# Patient Record
Sex: MALE | Race: WHITE | NOT HISPANIC OR LATINO | Employment: PART TIME | URBAN - METROPOLITAN AREA
[De-identification: names, ages, dates, MRNs, and addresses within clinical notes are randomized per-mention and may not be internally consistent; named-entity substitution may affect disease eponyms.]

---

## 2018-04-24 ENCOUNTER — OFFICE VISIT (OUTPATIENT)
Dept: INTERNAL MEDICINE | Facility: CLINIC | Age: 23
End: 2018-04-24
Payer: COMMERCIAL

## 2018-04-24 ENCOUNTER — TELEPHONE (OUTPATIENT)
Dept: INTERNAL MEDICINE | Facility: CLINIC | Age: 23
End: 2018-04-24

## 2018-04-24 VITALS
OXYGEN SATURATION: 97 % | SYSTOLIC BLOOD PRESSURE: 130 MMHG | BODY MASS INDEX: 26.15 KG/M2 | HEIGHT: 76 IN | HEART RATE: 74 BPM | WEIGHT: 214.75 LBS | TEMPERATURE: 99 F | DIASTOLIC BLOOD PRESSURE: 80 MMHG

## 2018-04-24 DIAGNOSIS — L60.0 INGROWING NAIL: Primary | ICD-10-CM

## 2018-04-24 PROCEDURE — 99213 OFFICE O/P EST LOW 20 MIN: CPT | Mod: S$GLB,,, | Performed by: PHYSICIAN ASSISTANT

## 2018-04-24 PROCEDURE — 99999 PR PBB SHADOW E&M-NEW PATIENT-LVL III: CPT | Mod: PBBFAC,,, | Performed by: PHYSICIAN ASSISTANT

## 2018-04-24 RX ORDER — ISOTRETINOIN 40 MG/1
40 CAPSULE ORAL DAILY
COMMUNITY
End: 2018-07-20

## 2018-04-24 RX ORDER — DOXYCYCLINE 100 MG/1
100 CAPSULE ORAL EVERY 12 HOURS
Qty: 20 CAPSULE | Refills: 0 | Status: SHIPPED | OUTPATIENT
Start: 2018-04-24 | End: 2018-04-27

## 2018-04-24 NOTE — TELEPHONE ENCOUNTER
Spoke with Mr. Raman. The medication was changed to Bactrim DS   Disp 14  1 tablet by mouth two times daily.  Pharmacist Lena  Notified patient by phone of change in medication with verbalized understanding.

## 2018-04-24 NOTE — PROGRESS NOTES
Subjective:       Patient ID: Stiven Arce is a 22 y.o. male.    Chief Complaint: Ingrown Toenail (right)    Ingrown Toenail   This is a new problem. The current episode started in the past 7 days. The problem occurs constantly. The problem has been gradually worsening. Pertinent negatives include no fever. The symptoms are aggravated by bending, standing and walking. Treatments tried: Tried to cut it out his self, but was unsuccesful.     Review of Systems   Constitutional: Negative for fever.       Objective:      Physical Exam   Constitutional: He appears well-developed and well-nourished. No distress.   Musculoskeletal:        Feet:    Skin: He is not diaphoretic.   Nursing note and vitals reviewed.      Assessment:       1. Ingrowing nail        Plan:       Ingrowing nail    Other orders  -     doxycycline (VIBRAMYCIN) 100 MG Cap; Take 1 capsule (100 mg total) by mouth every 12 (twelve) hours.  Dispense: 20 capsule; Refill: 0

## 2018-04-24 NOTE — TELEPHONE ENCOUNTER
----- Message from Ceci Slaughter sent at 4/24/2018  4:29 PM CDT -----  Contact: [facility]  Request a call concerning the pt antibiotic prescription, the pt can be reached at [phone]///thxMW

## 2018-04-24 NOTE — PATIENT INSTRUCTIONS
Continue with antibiotics and new medicines until gone. May take tylenol PRN fever. Increase fluids and rest. Epsome salt soaks. Suggest togo to the Emergency Room if symptoms get much worse. Otherwise follow up with as scheduled.

## 2018-04-27 ENCOUNTER — OFFICE VISIT (OUTPATIENT)
Dept: INTERNAL MEDICINE | Facility: CLINIC | Age: 23
End: 2018-04-27
Payer: COMMERCIAL

## 2018-04-27 VITALS
WEIGHT: 214.75 LBS | SYSTOLIC BLOOD PRESSURE: 126 MMHG | DIASTOLIC BLOOD PRESSURE: 72 MMHG | HEART RATE: 84 BPM | TEMPERATURE: 99 F | OXYGEN SATURATION: 99 % | HEIGHT: 76 IN | BODY MASS INDEX: 26.15 KG/M2

## 2018-04-27 DIAGNOSIS — L60.0 INGROWING NAIL, RIGHT GREAT TOE: Primary | ICD-10-CM

## 2018-04-27 PROCEDURE — 99999 PR PBB SHADOW E&M-EST. PATIENT-LVL IV: CPT | Mod: PBBFAC,,, | Performed by: PHYSICIAN ASSISTANT

## 2018-04-27 PROCEDURE — 99213 OFFICE O/P EST LOW 20 MIN: CPT | Mod: S$GLB,,, | Performed by: PHYSICIAN ASSISTANT

## 2018-04-27 RX ORDER — SULFAMETHOXAZOLE AND TRIMETHOPRIM 800; 160 MG/1; MG/1
1 TABLET ORAL 2 TIMES DAILY
Refills: 0 | COMMUNITY
Start: 2018-04-24 | End: 2018-07-20

## 2018-04-27 RX ORDER — TRAMADOL HYDROCHLORIDE 50 MG/1
50 TABLET ORAL EVERY 6 HOURS PRN
Qty: 15 TABLET | Refills: 0 | Status: SHIPPED | OUTPATIENT
Start: 2018-04-27 | End: 2018-05-02

## 2018-04-27 NOTE — PROGRESS NOTES
Subjective:       Patient ID: Stiven Arce is a 22 y.o. male.    Chief Complaint: Ingrown Toenail (right great toe)    Patient is a 21 yo male who was seen by me 3 to 4 days ago for an ingrown right great toe nail. He was put on antibiotics and is here today for an partial wedge resection. .       Ingrown Toenail   Pertinent negatives include no chills, fatigue or fever.     Review of Systems   Constitutional: Negative for chills, fatigue and fever.       Objective:      Physical Exam   Constitutional: He appears well-developed and well-nourished. No distress.   Musculoskeletal:        Feet:    Skin: He is not diaphoretic.   Nursing note and vitals reviewed.      Assessment:       1. Ingrowing nail, right great toe        Plan:       Ingrowing nail, right great toe  -     Excision Podiatry; Future    The informed consent was obtained, and the time out procedure performed. The involved toe was prepped in a sterile fashion. The involved digit was given 4 cc of lidocaine without epi to both sides and the plantar surface. After adequate anesthesia the involved portion of the  nail was  from the bed using hemostats.  After separation, nail scissors were uses to clip the ingrowing nail. Then it  was removed from the bed. Hydrogen Peroxide and normal saline was used for irrigation, follow by silver nitrate for additional anti-coagulation. Antibiotic dressing with roll gauze was applied. Minimal blood loss noted. Patient should keep the dressing on for 24 hours. Then may soak the involved digit in a warm epsom salt bath twice daily for 25 min. Continue this over the next several day until the foot feesl much better.   Also suggest to keep the foot elevated as much as possible over the next 3 to 4 days then ambulation as tolerated. Notify the clinic if any spreading redness is seen. If the clinic is closed go to the Emergency Room.     Other orders  -     Cancel: (In Office Administered) Tdap Vaccine  -      Cancel: (In Office Administered) HPV Vaccine (9-Valent) (3 Dose) (IM)  Patient declines  Vaccine today  -     traMADol (ULTRAM) 50 mg tablet; Take 1 tablet (50 mg total) by mouth every 6 (six) hours as needed for Pain.  Dispense: 15 tablet; Refill: 0

## 2018-04-27 NOTE — PATIENT INSTRUCTIONS
Ingrown Toenail (Excised)  An ingrown toenail occurs when the nail grows sideways into the skin next to the nail. This can cause pain and may lead to an infection with redness, swelling, and sometimes drainage.  The most common cause of an ingrown toenail is trimming your toenails wrong. Most people trim the nails too close to the skin and try to round the nail too tightly around the shape of the toe. When you do this, the nail can grow into the skin of the toe. While it may look nice, your toenail can grow into the skin and cause infection. It is safer to trim the nail to end in a straight line rather than a curve.  Other causes include injury or wearing shoes that are too short or tight. This can cause the same problem that happens when trimming your toenails. Sometimes you are born with a toenail that grows too large for your toe.  The most common symptoms of an ingrown toenail include:  · Pain  · Redness  · Swelling  · Drainage  Treatment  It's important to treat an ingrown toenail as soon as you notice there is a problem. If the infection is mild, you may be able to take care of it at home. Home care includes:  · Frequent warm water soaks  · Keeping the nail clean  · Wearing loose, comfortable shoes or open toe sandals  Another method to help the toe heal is to use a small piece of cotton or waxed dental floss to gently lift the corner of the problem nail. Change the cotton or floss frequently, especially if it gets dirty.  If your infection is mild but home care isn't working, or the toenail is getting worse, see your healthcare provider. Signs of worsening infection include:  · Swelling  · Redness   · Pus drainage  In some cases, part of the toenail needs to be removed by your healthcare provider so that the infection can be drained.  If there is a lot of redness and swelling, then an antibiotic may also be used. The redness and pain should go away within 48 hours. It will take about 2 weeks for the exposed  nail bed to become dry and for the swelling to go down.  If only the side of the nail was removed, it will begin to grow back in a few months. To prevent recurrence, sometimes the side of the nail bed may be treated with a strong chemical to prevent the nail from growing back.  Home care  Wound care  · Twice a day for the first 3 days, clean and soak the toe as follows:  ¨ Soak your foot in a tub of warm water for 5 minutes. Or, hold your toe under a faucet of warm running water for 5 minutes.  ¨ Clean any remaining crust away with soap and water using a cotton swab.  ¨ Put a small amount of antibiotic ointment on the infected area.  ¨ Cover with a bandage until the exposed nail bed is dry and there is no more drainage.  · Change the dressing or bandage every time you soak or clean it, or whenever it becomes wet or dirty.  · If you were prescribed antibiotics, take them as directed until they are all gone.  · While your toe is healing wear comfortable shoes with a lot of toe room. Or wear open-toe sandals.  Medicines  · You can take over-the-counter medicine for pain, unless you were given a different pain medicine to use. Note: Talk with your healthcare provider before using these medicines if you have chronic liver or kidney disease, have ever had a stomach ulcer or GI (gastrointestinal) bleeding, or are taking blood thinner medicines.  · If you were given antibiotics, take them until they are all gone. It is important to finish the antibiotics even if the wound looks better. This ensures that the infection clears.  Prevention  To prevent ingrown toenails:  · Wear shoes that fit well. Avoid shoes that pinch the toes together.  · When you trim your toenails, dont cut them too short. Cut straight across at the top and dont round the edges.  · Dont use a sharp object to clean under your nail since this might cause an infection.  · If the toenail starts to grow into the skin again, put a small piece of cotton under  that side of the nail to help it grow out straight.  Follow-up care  Follow up as advised by your healthcare provider. If the ingrown toenail recurs, follow up with a foot specialist (podiatrist) for nail bed ablation.  When to seek medical care  Call your healthcare provider right away if any of these occur:  · Increasing redness, pain, or swelling of the toe  · Red streaks in the skin leading away from the wound  · Continued pus or fluid drainage for more than 24 hours  · Fever of 100.4°F (38°C) or higher, or as directed by your provider  Date Last Reviewed: 11/1/2016  © 9469-9797 [employer]. [address] [place] [address], [place] [address]. All rights reserved. This information is not intended as a substitute for professional medical care. Always follow your healthcare professional's instructions.

## 2018-07-20 ENCOUNTER — OFFICE VISIT (OUTPATIENT)
Dept: INTERNAL MEDICINE | Facility: CLINIC | Age: 23
End: 2018-07-20
Payer: COMMERCIAL

## 2018-07-20 VITALS
TEMPERATURE: 99 F | OXYGEN SATURATION: 98 % | SYSTOLIC BLOOD PRESSURE: 122 MMHG | HEART RATE: 63 BPM | DIASTOLIC BLOOD PRESSURE: 70 MMHG | HEIGHT: 76 IN | BODY MASS INDEX: 26.39 KG/M2 | WEIGHT: 216.69 LBS

## 2018-07-20 DIAGNOSIS — F41.9 ANXIETY: ICD-10-CM

## 2018-07-20 DIAGNOSIS — J02.9 ACUTE PHARYNGITIS, UNSPECIFIED ETIOLOGY: Primary | ICD-10-CM

## 2018-07-20 LAB — DEPRECATED S PYO AG THROAT QL EIA: NEGATIVE

## 2018-07-20 PROCEDURE — 87081 CULTURE SCREEN ONLY: CPT

## 2018-07-20 PROCEDURE — 3008F BODY MASS INDEX DOCD: CPT | Mod: CPTII,S$GLB,, | Performed by: PHYSICIAN ASSISTANT

## 2018-07-20 PROCEDURE — 87880 STREP A ASSAY W/OPTIC: CPT

## 2018-07-20 PROCEDURE — 99999 PR PBB SHADOW E&M-EST. PATIENT-LVL IV: CPT | Mod: PBBFAC,,, | Performed by: PHYSICIAN ASSISTANT

## 2018-07-20 PROCEDURE — 99214 OFFICE O/P EST MOD 30 MIN: CPT | Mod: S$GLB,,, | Performed by: PHYSICIAN ASSISTANT

## 2018-07-20 RX ORDER — AZITHROMYCIN 250 MG/1
TABLET, FILM COATED ORAL
Qty: 6 TABLET | Refills: 0 | Status: SHIPPED | OUTPATIENT
Start: 2018-07-20 | End: 2018-09-04

## 2018-07-20 RX ORDER — PAROXETINE HYDROCHLORIDE HEMIHYDRATE 25 MG/1
25 TABLET, FILM COATED, EXTENDED RELEASE ORAL EVERY MORNING
Qty: 30 TABLET | Refills: 1 | Status: SHIPPED | OUTPATIENT
Start: 2018-07-20 | End: 2018-09-04

## 2018-07-20 NOTE — PATIENT INSTRUCTIONS
Understanding Anxiety Disorders  Almost everyone gets nervous now and then. Its normal to have knots in your stomach before a test, or for your heart to race on a first date. But an anxiety disorder is much more than a case of nerves. In fact, its symptoms may be overwhelming. But treatment can relieve many of these symptoms. Talking to your healthcare provider is the first step.    What are anxiety disorders?  An anxiety disorder causes intense feelings of panic and fear. These feelings may arise for no apparent reason. And they tend to recur again and again. They may prevent you from coping with life and cause you great distress. As a result, you may avoid anything that triggers your fear. In extreme cases, you may never leave the house. Anxiety disorders may cause other symptoms, such as:  · Obsessive thoughts you cant control  · Constant nightmares or painful thoughts of the past  · Nausea, sweating, and muscle tension  · Trouble sleeping or concentrating  What causes anxiety disorders?  Anxiety disorders tend to run in families. For some people, childhood abuse or neglect may play a role. For others, stressful life events or trauma may trigger anxiety disorders. Anxiety can trigger low self-esteem and poor coping skills.  Common anxiety disorders  · Panic disorder. This causes an intense fear of being in danger.  · Phobias. These are extreme fears of certain objects, places, or events.  · Obsessive-compulsive disorder. This causes you to have unwanted thoughts and urges. You also may perform certain actions over and over.  · Posttraumatic stress disorder. This occurs in people who have survived a terrible ordeal. It can cause nightmares and flashbacks about the event.  · Generalized anxiety disorder. This causes constant worry that can greatly disrupt your life.   Getting better  You may believe that nothing can help you. Or, you might fear what others may think. But most anxiety symptoms can be eased.  Having an anxiety disorder is nothing to be ashamed of. Most people do best with treatment that combines medicine and therapy. These arent cures. But they can help you live a healthier life.  Date Last Reviewed: 2/1/2017  © 2623-5963 [employer]. [address] [place] [address], [place] [address]. All rights reserved. This information is not intended as a substitute for professional medical care. Always follow your healthcare professional's instructions.

## 2018-07-20 NOTE — PROGRESS NOTES
Subjective:       Patient ID: Stiven Arce is a 22 y.o. male.    Chief Complaint: Sore Throat and Anxiety    Sore Throat    This is a new problem. The current episode started in the past 7 days. The problem has been unchanged. There has been no fever. Associated symptoms include headaches, swollen glands and trouble swallowing. Pertinent negatives include no abdominal pain, congestion or shortness of breath. He has had exposure to strep. He has had no exposure to mono. He has tried nothing for the symptoms.   Anxiety   Presents for initial visit. Onset was at an unknown time. The problem has been gradually worsening. Symptoms include decreased concentration, muscle tension and nervous/anxious behavior. Patient reports no chest pain, confusion, shortness of breath or suicidal ideas. Symptoms occur most days. The severity of symptoms is interfering with daily activities. The symptoms are aggravated by specific phobias and social activities. The quality of sleep is good.     Risk factors include family history. Past treatments include nothing.     Review of Systems   Constitutional: Negative for chills and fatigue.   HENT: Positive for sore throat and trouble swallowing. Negative for congestion, postnasal drip and sinus pressure.    Respiratory: Negative for chest tightness and shortness of breath.    Cardiovascular: Negative for chest pain.   Gastrointestinal: Negative for abdominal pain.   Neurological: Positive for headaches.   Psychiatric/Behavioral: Positive for decreased concentration. Negative for agitation, behavioral problems, confusion, dysphoric mood, hallucinations, self-injury, sleep disturbance and suicidal ideas. The patient is nervous/anxious. The patient is not hyperactive.        Objective:      Physical Exam   Constitutional: He appears well-developed and well-nourished. No distress.   HENT:   Head: Normocephalic and atraumatic.   Right Ear: Tympanic membrane and ear canal normal.   Left Ear:  Tympanic membrane and ear canal normal.   Nose: Mucosal edema and rhinorrhea present.   Mouth/Throat: Uvula is midline. Posterior oropharyngeal edema and posterior oropharyngeal erythema present. Tonsillar exudate.   Skin: He is not diaphoretic.   Nursing note and vitals reviewed.      Assessment:       1. Acute pharyngitis, unspecified etiology    2. Anxiety        Plan:       Acute pharyngitis, unspecified etiology  -     Throat Screen, Rapid    Anxiety    Other orders  -     paroxetine (PAXIL-CR) 25 MG 24 hr tablet; Take 1 tablet (25 mg total) by mouth every morning.  Dispense: 30 tablet; Refill: 1  -     Strep A culture, throat  -     azithromycin (Z-REJI) 250 MG tablet; Follow instructions on pack.  Dispense: 6 tablet; Refill: 0

## 2018-07-23 LAB — BACTERIA THROAT CULT: NORMAL

## 2018-09-04 ENCOUNTER — OFFICE VISIT (OUTPATIENT)
Dept: FAMILY MEDICINE | Facility: CLINIC | Age: 23
End: 2018-09-04
Payer: COMMERCIAL

## 2018-09-04 VITALS
HEIGHT: 76 IN | HEART RATE: 86 BPM | BODY MASS INDEX: 26.2 KG/M2 | OXYGEN SATURATION: 99 % | WEIGHT: 215.19 LBS | SYSTOLIC BLOOD PRESSURE: 123 MMHG | RESPIRATION RATE: 19 BRPM | TEMPERATURE: 98 F | DIASTOLIC BLOOD PRESSURE: 71 MMHG

## 2018-09-04 DIAGNOSIS — J02.0 STREP PHARYNGITIS: Primary | ICD-10-CM

## 2018-09-04 LAB
CTP QC/QA: YES
S PYO RRNA THROAT QL PROBE: POSITIVE

## 2018-09-04 PROCEDURE — 99214 OFFICE O/P EST MOD 30 MIN: CPT | Mod: 25,S$GLB,, | Performed by: NURSE PRACTITIONER

## 2018-09-04 PROCEDURE — 96372 THER/PROPH/DIAG INJ SC/IM: CPT | Mod: S$GLB,,, | Performed by: NURSE PRACTITIONER

## 2018-09-04 PROCEDURE — 87880 STREP A ASSAY W/OPTIC: CPT | Mod: QW,S$GLB,, | Performed by: NURSE PRACTITIONER

## 2018-09-04 PROCEDURE — 99999 PR PBB SHADOW E&M-EST. PATIENT-LVL III: CPT | Mod: PBBFAC,,, | Performed by: NURSE PRACTITIONER

## 2018-09-04 PROCEDURE — 3008F BODY MASS INDEX DOCD: CPT | Mod: CPTII,S$GLB,, | Performed by: NURSE PRACTITIONER

## 2018-09-04 RX ORDER — METHYLPREDNISOLONE ACETATE 80 MG/ML
80 INJECTION, SUSPENSION INTRA-ARTICULAR; INTRALESIONAL; INTRAMUSCULAR; SOFT TISSUE ONCE
Status: COMPLETED | OUTPATIENT
Start: 2018-09-04 | End: 2018-09-04

## 2018-09-04 RX ADMIN — METHYLPREDNISOLONE ACETATE 80 MG: 80 INJECTION, SUSPENSION INTRA-ARTICULAR; INTRALESIONAL; INTRAMUSCULAR; SOFT TISSUE at 10:09

## 2018-09-04 NOTE — PROGRESS NOTES
"CC: Sore Throat  HPI: This is a new problem.   Patient complains of sore throat. Associated symptoms include 100 and sore throat. Onset of symptoms was 4 days ago, and have been unchanged since that time. He is drinking plenty of fluids. He has not had recent close exposure to someone with proven streptococcal pharyngitis.    [unfilled]  Outpatient Medications Prior to Visit   Medication Sig Dispense Refill    azithromycin (Z-REJI) 250 MG tablet Follow instructions on pack. 6 tablet 0    paroxetine (PAXIL-CR) 25 MG 24 hr tablet Take 1 tablet (25 mg total) by mouth every morning. 30 tablet 1     No facility-administered medications prior to visit.         Physical Exam   /71   Pulse 86   Temp 97.8 °F (36.6 °C) (Tympanic)   Resp 19   Ht 6' 4" (1.93 m)   Wt 97.6 kg (215 lb 2.7 oz)   SpO2 99%   BMI 26.19 kg/m²   Constitutional: The patient appears well-developed and well-nourished.   Head: Normocephalic and atraumatic.   Right Ear: Tympanic membrane and ear canal normal. No drainage, swelling or tenderness. Tympanic membrane is not injected, not erythematous and not bulging.   Left Ear: Ear canal normal. No drainage, swelling or tenderness. Tympanic membrane is not injected, not erythematous and not bulging.   Nose: Mucosal edema and rhinorrhea present. No sinus tenderness on palpation  Mouth/Throat: Uvula is midline. Posterior oropharyngeal erythema present. No oropharyngeal exudate.        THE MUCOSA IS BOGGY AND ERYTHEMATOUS.     Eyes: Conjunctivae normal and lids are normal. Pupils are equal, round, and reactive to light. Right eye exhibits no discharge. Left eye exhibits no discharge. Right eye exhibits normal extraocular motion. Left eye exhibits normal extraocular motion.   Neck: Trachea normal and normal range of motion. Neck supple. No tracheal tenderness present. No mass and no thyromegaly present.   Cardiovascular: Normal rate, regular rhythm, S1 normal, S2 normal and normal heart sounds.  " Exam reveals no gallop, no S3, no S4 and no friction rub.    No murmur heard.  Pulmonary/Chest: Effort normal and breath sounds normal. No stridor. Not tachypneic. No respiratory distress. The patient has no wheezes. The patient has no rhonchi. The patient has no rales.   Skin: The patient is not diaphoretic.     Encounter Diagnosis   Name Primary?    Strep pharyngitis Yes       PLAN:    Stiven was seen today for sore throat, nasal congestion and fever.    Diagnoses and all orders for this visit:    Strep pharyngitis  -     POCT Rapid Strep A  -     Strep A culture, throat  -     penicillin G benzathine (BICILLIN LA) injection 1.2 Million Units; Inject 2 mLs (1.2 Million Units total) into the muscle one time.  -     methylPREDNISolone acetate injection 80 mg; Inject 1 mL (80 mg total) into the muscle once.      Medications Ordered This Encounter   Medications    methylPREDNISolone acetate injection 80 mg    penicillin G benzathine (BICILLIN LA) injection 1.2 Million Units     Orders Placed This Encounter   Procedures    Strep A culture, throat    POCT Rapid Strep A     RTC if symptoms are worsening or changing significantly or if not improved by the end of therapy.

## 2018-10-04 ENCOUNTER — OFFICE VISIT (OUTPATIENT)
Dept: FAMILY MEDICINE | Facility: CLINIC | Age: 23
End: 2018-10-04
Payer: COMMERCIAL

## 2018-10-04 VITALS
TEMPERATURE: 99 F | BODY MASS INDEX: 24.47 KG/M2 | WEIGHT: 200.94 LBS | HEIGHT: 76 IN | HEART RATE: 98 BPM | OXYGEN SATURATION: 100 % | SYSTOLIC BLOOD PRESSURE: 108 MMHG | DIASTOLIC BLOOD PRESSURE: 68 MMHG

## 2018-10-04 DIAGNOSIS — A08.4 VIRAL GASTROENTERITIS: Primary | ICD-10-CM

## 2018-10-04 PROCEDURE — 99213 OFFICE O/P EST LOW 20 MIN: CPT | Mod: S$GLB,,, | Performed by: NURSE PRACTITIONER

## 2018-10-04 PROCEDURE — 99999 PR PBB SHADOW E&M-EST. PATIENT-LVL III: CPT | Mod: PBBFAC,,, | Performed by: NURSE PRACTITIONER

## 2018-10-04 PROCEDURE — 3008F BODY MASS INDEX DOCD: CPT | Mod: CPTII,S$GLB,, | Performed by: NURSE PRACTITIONER

## 2018-10-04 RX ORDER — ONDANSETRON HYDROCHLORIDE 8 MG/1
8 TABLET, FILM COATED ORAL EVERY 8 HOURS PRN
Qty: 24 TABLET | Refills: 0 | Status: SHIPPED | OUTPATIENT
Start: 2018-10-04 | End: 2020-01-08

## 2018-10-04 NOTE — PROGRESS NOTES
"CC:Nausea/Vomiting  HPI:This is a new problem.   The current episode started in the past 4 days.   The problem has been gradually worsening.   Associated symptoms included diarrhea occurring for one day. Nausea no vomiting, girlfriend sick with similar symptoms  Pertinent negatives include inability to keep down fluids.  Treatments tried: none has been used and this has provided no relief.     [unfilled]  No outpatient medications prior to visit.     No facility-administered medications prior to visit.         Physical Exam   /68 (BP Location: Left arm, Patient Position: Sitting, BP Method: Large (Manual))   Pulse 98   Temp 99 °F (37.2 °C) (Tympanic)   Ht 6' 4" (1.93 m)   Wt 91.2 kg (200 lb 15.2 oz)   SpO2 100%   BMI 24.46 kg/m²   Constitutional: The patient appears well-developed and well-nourished.   Head: Normocephalic and atraumatic.   Right Ear: Tympanic membrane and ear canal normal. No drainage, swelling or tenderness. Tympanic membrane is not injected, not erythematous and not bulging.   Left Ear: Ear canal normal. No drainage, swelling or tenderness. Tympanic membrane is not injected, not erythematous and not bulging.   Nose:  No mucosal edema or rhinitis is noted.      Mouth/Throat: Uvula is midline.  Posterior oropharynx is not erythematous. No oropharyngeal exudate is noted.    Eyes: Conjunctivae normal and lids are normal. Pupils are equal, round, and reactive to light. Right eye exhibits no discharge. Left eye exhibits no discharge. Right eye exhibits normal extraocular motion. Left eye exhibits normal extraocular motion.   Neck: Trachea normal and normal range of motion. Neck supple. No tracheal tenderness present. No mass and no thyromegaly present.   Cardiovascular: Normal rate, regular rhythm, S1 normal, S2 normal and normal heart sounds.  Exam reveals no gallop, no S3, no S4 and no friction rub.    No murmur heard.  Pulmonary/Chest: Effort normal and breath sounds are normal. No " stridor. Not tachypneic. No respiratory distress. The patient has no wheezes, ronchi, or rales.   Abdomen: The patient has hyperactive bowel sounds and has mild diffuse tenderness without any focal pain.  There is no rebound or guarding noted.  No distention is noted.  Skin: The patient is not diaphoretic.     Encounter Diagnosis   Name Primary?    Viral gastroenteritis Yes       PLAN:    Stiven was seen today for abdominal pain, diarrhea and fever.    Diagnoses and all orders for this visit:    Viral gastroenteritis  -     ondansetron (ZOFRAN) 8 MG tablet; Take 1 tablet (8 mg total) by mouth every 8 (eight) hours as needed for Nausea.  Soft bland diet. Avoid use of anti-motility    Medications Ordered This Encounter   Medications    ondansetron (ZOFRAN) 8 MG tablet     Sig: Take 1 tablet (8 mg total) by mouth every 8 (eight) hours as needed for Nausea.     Dispense:  24 tablet     Refill:  0     No orders of the defined types were placed in this encounter.    RTC if symptoms are worsening or changing significantly or if not improved by the end of therapy.

## 2018-10-04 NOTE — PATIENT INSTRUCTIONS

## 2019-12-04 ENCOUNTER — OFFICE VISIT (OUTPATIENT)
Dept: FAMILY MEDICINE | Facility: CLINIC | Age: 24
End: 2019-12-04
Payer: COMMERCIAL

## 2019-12-04 VITALS
HEIGHT: 76 IN | RESPIRATION RATE: 16 BRPM | BODY MASS INDEX: 25.1 KG/M2 | HEART RATE: 68 BPM | WEIGHT: 206.13 LBS | DIASTOLIC BLOOD PRESSURE: 80 MMHG | SYSTOLIC BLOOD PRESSURE: 120 MMHG | TEMPERATURE: 98 F | OXYGEN SATURATION: 98 %

## 2019-12-04 DIAGNOSIS — F40.10 SOCIAL ANXIETY DISORDER: Primary | ICD-10-CM

## 2019-12-04 PROCEDURE — 99214 OFFICE O/P EST MOD 30 MIN: CPT | Mod: S$GLB,,, | Performed by: FAMILY MEDICINE

## 2019-12-04 PROCEDURE — 3008F BODY MASS INDEX DOCD: CPT | Mod: CPTII,S$GLB,, | Performed by: FAMILY MEDICINE

## 2019-12-04 PROCEDURE — 99999 PR PBB SHADOW E&M-NEW PATIENT-LVL III: ICD-10-PCS | Mod: PBBFAC,,, | Performed by: FAMILY MEDICINE

## 2019-12-04 PROCEDURE — 99214 PR OFFICE/OUTPT VISIT, EST, LEVL IV, 30-39 MIN: ICD-10-PCS | Mod: S$GLB,,, | Performed by: FAMILY MEDICINE

## 2019-12-04 PROCEDURE — 3008F PR BODY MASS INDEX (BMI) DOCUMENTED: ICD-10-PCS | Mod: CPTII,S$GLB,, | Performed by: FAMILY MEDICINE

## 2019-12-04 PROCEDURE — 99999 PR PBB SHADOW E&M-NEW PATIENT-LVL III: CPT | Mod: PBBFAC,,, | Performed by: FAMILY MEDICINE

## 2019-12-04 RX ORDER — SERTRALINE HYDROCHLORIDE 25 MG/1
25 TABLET, FILM COATED ORAL DAILY
Qty: 30 TABLET | Refills: 1 | Status: SHIPPED | OUTPATIENT
Start: 2019-12-04 | End: 2020-01-08

## 2019-12-04 NOTE — PROGRESS NOTES
Subjective:       Patient ID: Stiven Arce is a 23 y.o. male.    Chief Complaint: Anxiety    Anxiety   Presents for initial visit. Onset was more than 5 years ago. The problem has been unchanged. Symptoms include excessive worry and nervous/anxious behavior. Patient reports no chest pain, decreased concentration, depressed mood, dizziness, insomnia, irritability, nausea, panic, shortness of breath or suicidal ideas. Symptoms occur most days. The severity of symptoms is severe. The symptoms are aggravated by social activities.     There are no known risk factors. His past medical history is significant for anxiety/panic attacks. There is no history of bipolar disorder, depression or suicide attempts. Treatments tried: vistaril. The treatment provided no relief.   Patient notes having significant social stressors. Has tried Vistaril without improvement. Desires maintenance medication. His mother is good a support system. He lives alone and currently works as a .    Past Medical History:   Diagnosis Date    Anxiety      Past Surgical History:   Procedure Laterality Date    WISDOM TOOTH EXTRACTION       Family History   Problem Relation Age of Onset    Anxiety disorder Mother     No Known Problems Father      Review of Systems   Constitutional: Negative for fatigue, irritability and unexpected weight change.   HENT: Negative for congestion, ear pain and sinus pressure.    Eyes: Negative for visual disturbance.   Respiratory: Negative for cough and shortness of breath.    Cardiovascular: Negative for chest pain.   Gastrointestinal: Negative for diarrhea and nausea.   Genitourinary: Negative for decreased urine volume and difficulty urinating.   Musculoskeletal: Negative for arthralgias and myalgias.   Skin: Negative for rash.   Neurological: Negative for dizziness, weakness and headaches.   Psychiatric/Behavioral: Negative for decreased concentration and suicidal ideas. The patient is  "nervous/anxious. The patient does not have insomnia.        Objective:   /80   Pulse 68   Temp 97.6 °F (36.4 °C) (Temporal)   Resp 16   Ht 6' 4" (1.93 m)   Wt 93.5 kg (206 lb 2.1 oz)   SpO2 98%   BMI 25.09 kg/m²   Physical Exam   Constitutional: He is oriented to person, place, and time. He appears well-developed and well-nourished. No distress.   HENT:   Head: Normocephalic and atraumatic.   Right Ear: External ear normal.   Left Ear: External ear normal.   Nose: Nose normal.   Mouth/Throat: Oropharynx is clear and moist.   Eyes: Pupils are equal, round, and reactive to light. Conjunctivae and EOM are normal.   Neck: Normal range of motion. Neck supple.   Cardiovascular: Normal rate, regular rhythm and normal heart sounds.   Pulmonary/Chest: Effort normal and breath sounds normal.   Abdominal: Soft. Bowel sounds are normal.   Neurological: He is alert and oriented to person, place, and time.   Skin: Skin is warm and dry. He is not diaphoretic.   Psychiatric: He has a normal mood and affect. His behavior is normal.       Assessment:       1. Social anxiety disorder        Plan:      Social anxiety disorder  New diagnosis. Spent time today discussing treatment options and reviewing coping mechanisms. Will initiate Zoloft- timing of administration and s/e profile has been reviewed. Patient is aware of counseling/therapy options within [facility] and has declined referral at this time. Will plan on short interval follow-up and he knows to contact me through the patient portal with additional concerns.  -     sertraline (ZOLOFT) 25 MG tablet; Take 1 tablet (25 mg total) by mouth once daily.  Dispense: 30 tablet; Refill: 1        "

## 2020-01-08 ENCOUNTER — OFFICE VISIT (OUTPATIENT)
Dept: FAMILY MEDICINE | Facility: CLINIC | Age: 25
End: 2020-01-08
Payer: COMMERCIAL

## 2020-01-08 VITALS
TEMPERATURE: 98 F | OXYGEN SATURATION: 98 % | BODY MASS INDEX: 25.05 KG/M2 | WEIGHT: 205.69 LBS | RESPIRATION RATE: 16 BRPM | HEART RATE: 81 BPM | SYSTOLIC BLOOD PRESSURE: 110 MMHG | DIASTOLIC BLOOD PRESSURE: 60 MMHG | HEIGHT: 76 IN

## 2020-01-08 DIAGNOSIS — F40.10 SOCIAL ANXIETY DISORDER: ICD-10-CM

## 2020-01-08 PROCEDURE — 99999 PR PBB SHADOW E&M-EST. PATIENT-LVL III: ICD-10-PCS | Mod: PBBFAC,,, | Performed by: FAMILY MEDICINE

## 2020-01-08 PROCEDURE — 99213 PR OFFICE/OUTPT VISIT, EST, LEVL III, 20-29 MIN: ICD-10-PCS | Mod: S$GLB,,, | Performed by: FAMILY MEDICINE

## 2020-01-08 PROCEDURE — 99999 PR PBB SHADOW E&M-EST. PATIENT-LVL III: CPT | Mod: PBBFAC,,, | Performed by: FAMILY MEDICINE

## 2020-01-08 PROCEDURE — 3008F PR BODY MASS INDEX (BMI) DOCUMENTED: ICD-10-PCS | Mod: CPTII,S$GLB,, | Performed by: FAMILY MEDICINE

## 2020-01-08 PROCEDURE — 99213 OFFICE O/P EST LOW 20 MIN: CPT | Mod: S$GLB,,, | Performed by: FAMILY MEDICINE

## 2020-01-08 PROCEDURE — 3008F BODY MASS INDEX DOCD: CPT | Mod: CPTII,S$GLB,, | Performed by: FAMILY MEDICINE

## 2020-01-08 RX ORDER — SERTRALINE HYDROCHLORIDE 50 MG/1
50 TABLET, FILM COATED ORAL DAILY
Qty: 30 TABLET | Refills: 1 | Status: SHIPPED | OUTPATIENT
Start: 2020-01-08 | End: 2020-12-15 | Stop reason: SDUPTHER

## 2020-01-08 NOTE — PROGRESS NOTES
"Subjective:       Patient ID: Stiven Arce is a 24 y.o. male.    Chief Complaint: Follow-up    HPI   Follow-up  23yo male presents today for follow-up. He was recently evaluated with concerns for social anxiety and Zoloft was initiated. He notes that since starting treatment there has been some improvement with regard to mood symptoms. He does feel that he would benefit from dosing adjustment. No adverse effects of treatment reported. He denies any depressive symptoms or trouble sleeping. No report of SI/HI/AH or VH. He does have a good support system.    Review of Systems   Constitutional: Negative for activity change, appetite change, fatigue and unexpected weight change.   Eyes: Negative for visual disturbance.   Respiratory: Negative for cough and shortness of breath.    Cardiovascular: Negative for chest pain and palpitations.   Gastrointestinal: Negative for diarrhea and nausea.   Genitourinary: Negative for decreased urine volume and difficulty urinating.   Musculoskeletal: Negative for arthralgias and myalgias.   Neurological: Negative for dizziness and headaches.   Psychiatric/Behavioral: Negative for dysphoric mood and sleep disturbance. The patient is nervous/anxious.        Objective:   /60   Pulse 81   Temp 97.8 °F (36.6 °C) (Temporal)   Resp 16   Ht 6' 4" (1.93 m)   Wt 93.3 kg (205 lb 11 oz)   SpO2 98%   BMI 25.04 kg/m²   Physical Exam   Constitutional: He is oriented to person, place, and time. He appears well-developed and well-nourished. No distress.   HENT:   Head: Normocephalic and atraumatic.   Right Ear: External ear normal.   Left Ear: External ear normal.   Nose: Nose normal.   Mouth/Throat: Oropharynx is clear and moist.   Eyes: Pupils are equal, round, and reactive to light. Conjunctivae and EOM are normal.   Neck: Normal range of motion. Neck supple.   Cardiovascular: Normal rate, regular rhythm and normal heart sounds.   Pulmonary/Chest: Effort normal and breath " sounds normal.   Neurological: He is alert and oriented to person, place, and time.   Skin: Skin is warm. He is not diaphoretic.   Psychiatric: He has a normal mood and affect. His behavior is normal.       Assessment:       1. Social anxiety disorder        Plan:      Social anxiety disorder  Uncontrolled. Spent time today discussing treatment options and reviewing coping mechanisms. Patient is aware of SW[facility]counseling options within [facility]. Adjustments to medication regimen have been made today and arrangements for short interval follow-up have been made. Patient knows to contact me in the interim with additional concerns.   -     sertraline (ZOLOFT) 50 MG tablet; Take 1 tablet (50 mg total) by mouth once daily.  Dispense: 30 tablet; Refill: 1

## 2020-03-07 DIAGNOSIS — F40.10 SOCIAL ANXIETY DISORDER: ICD-10-CM

## 2020-03-09 ENCOUNTER — PATIENT MESSAGE (OUTPATIENT)
Dept: FAMILY MEDICINE | Facility: CLINIC | Age: 25
End: 2020-03-09

## 2020-03-09 RX ORDER — SERTRALINE HYDROCHLORIDE 50 MG/1
TABLET, FILM COATED ORAL
Qty: 30 TABLET | Refills: 1 | OUTPATIENT
Start: 2020-03-09

## 2020-03-09 NOTE — TELEPHONE ENCOUNTER
Patient missed his follow-up and needs to be scheduled. Offer appt this week. Can provide a short supply of RX if needed.

## 2020-12-15 ENCOUNTER — OFFICE VISIT (OUTPATIENT)
Dept: FAMILY MEDICINE | Facility: CLINIC | Age: 25
End: 2020-12-15
Attending: FAMILY MEDICINE
Payer: COMMERCIAL

## 2020-12-15 VITALS
HEIGHT: 76 IN | BODY MASS INDEX: 25.02 KG/M2 | WEIGHT: 205.5 LBS | OXYGEN SATURATION: 98 % | SYSTOLIC BLOOD PRESSURE: 112 MMHG | HEART RATE: 80 BPM | TEMPERATURE: 100 F | DIASTOLIC BLOOD PRESSURE: 56 MMHG

## 2020-12-15 DIAGNOSIS — F40.10 SOCIAL ANXIETY DISORDER: ICD-10-CM

## 2020-12-15 PROCEDURE — 99213 PR OFFICE/OUTPT VISIT, EST, LEVL III, 20-29 MIN: ICD-10-PCS | Mod: S$GLB,,, | Performed by: FAMILY MEDICINE

## 2020-12-15 PROCEDURE — 1126F AMNT PAIN NOTED NONE PRSNT: CPT | Mod: S$GLB,,, | Performed by: FAMILY MEDICINE

## 2020-12-15 PROCEDURE — 3008F BODY MASS INDEX DOCD: CPT | Mod: CPTII,S$GLB,, | Performed by: FAMILY MEDICINE

## 2020-12-15 PROCEDURE — 99999 PR PBB SHADOW E&M-EST. PATIENT-LVL III: CPT | Mod: PBBFAC,,, | Performed by: FAMILY MEDICINE

## 2020-12-15 PROCEDURE — 3008F PR BODY MASS INDEX (BMI) DOCUMENTED: ICD-10-PCS | Mod: CPTII,S$GLB,, | Performed by: FAMILY MEDICINE

## 2020-12-15 PROCEDURE — 99999 PR PBB SHADOW E&M-EST. PATIENT-LVL III: ICD-10-PCS | Mod: PBBFAC,,, | Performed by: FAMILY MEDICINE

## 2020-12-15 PROCEDURE — 99213 OFFICE O/P EST LOW 20 MIN: CPT | Mod: S$GLB,,, | Performed by: FAMILY MEDICINE

## 2020-12-15 PROCEDURE — 1126F PR PAIN SEVERITY QUANTIFIED, NO PAIN PRESENT: ICD-10-PCS | Mod: S$GLB,,, | Performed by: FAMILY MEDICINE

## 2020-12-15 RX ORDER — SERTRALINE HYDROCHLORIDE 50 MG/1
50 TABLET, FILM COATED ORAL DAILY
Qty: 30 TABLET | Refills: 6 | Status: SHIPPED | OUTPATIENT
Start: 2020-12-15 | End: 2021-10-05

## 2020-12-15 NOTE — PROGRESS NOTES
Subjective:       Patient ID: Stiven Arce is a 24 y.o. male.    Chief Complaint: Medication Refill    24 y old male  With SAD here for f.u . He ran out of zoloft in March . Previously doing well . Now  Has been more anxious since the  holidays . No depression . Will like to resume taking it .     Review of Systems   Constitutional: Negative.    HENT: Negative.    Eyes: Negative.    Respiratory: Negative.    Cardiovascular: Negative.    Gastrointestinal: Negative.    Genitourinary: Negative.    Musculoskeletal: Negative.    Skin: Negative.    Hematological: Negative.    Psychiatric/Behavioral: Positive for agitation.       Objective:      Physical Exam  Constitutional:       General: He is not in acute distress.     Appearance: He is well-developed. He is not diaphoretic.   HENT:      Head: Normocephalic and atraumatic.      Right Ear: External ear normal.      Left Ear: External ear normal.      Nose: Nose normal.      Mouth/Throat:      Pharynx: No oropharyngeal exudate.   Eyes:      General: No scleral icterus.        Right eye: No discharge.         Left eye: No discharge.      Conjunctiva/sclera: Conjunctivae normal.      Pupils: Pupils are equal, round, and reactive to light.   Neck:      Musculoskeletal: Normal range of motion and neck supple.      Thyroid: No thyromegaly.      Vascular: No JVD.      Trachea: No tracheal deviation.   Cardiovascular:      Rate and Rhythm: Normal rate and regular rhythm.      Heart sounds: Normal heart sounds. No murmur. No friction rub. No gallop.    Pulmonary:      Effort: Pulmonary effort is normal. No respiratory distress.      Breath sounds: Normal breath sounds. No stridor. No wheezing or rales.   Chest:      Chest wall: No tenderness.   Abdominal:      General: Bowel sounds are normal. There is no distension.      Palpations: Abdomen is soft.      Tenderness: There is no abdominal tenderness. There is no guarding or rebound.   Musculoskeletal: Normal range of  motion.         General: No tenderness.   Lymphadenopathy:      Cervical: No cervical adenopathy.   Skin:     General: Skin is warm and dry.      Coloration: Skin is not pale.      Findings: No erythema or rash.   Neurological:      Mental Status: He is alert and oriented to person, place, and time.      Cranial Nerves: No cranial nerve deficit.      Motor: No abnormal muscle tone.      Coordination: Coordination normal.      Deep Tendon Reflexes: Reflexes are normal and symmetric. Reflexes normal.   Psychiatric:         Behavior: Behavior normal.         Thought Content: Thought content normal.         Judgment: Judgment normal.         Assessment:       1. Social anxiety disorder        Plan:     Stiven was seen today for medication refill.    Diagnoses and all orders for this visit:    Social anxiety disorder  -     sertraline (ZOLOFT) 50 MG tablet; Take 1 tablet (50 mg total) by mouth once daily.     Resume zoloft   Advised against stopping med abruptly   F.u in 6 m

## 2021-05-12 ENCOUNTER — PATIENT MESSAGE (OUTPATIENT)
Dept: RESEARCH | Facility: HOSPITAL | Age: 26
End: 2021-05-12

## 2021-10-05 ENCOUNTER — OFFICE VISIT (OUTPATIENT)
Dept: FAMILY MEDICINE | Facility: CLINIC | Age: 26
End: 2021-10-05
Attending: FAMILY MEDICINE
Payer: COMMERCIAL

## 2021-10-05 VITALS
TEMPERATURE: 98 F | OXYGEN SATURATION: 99 % | WEIGHT: 198.06 LBS | HEART RATE: 67 BPM | BODY MASS INDEX: 24.12 KG/M2 | SYSTOLIC BLOOD PRESSURE: 112 MMHG | DIASTOLIC BLOOD PRESSURE: 62 MMHG | HEIGHT: 76 IN

## 2021-10-05 DIAGNOSIS — F40.10 SOCIAL ANXIETY DISORDER: ICD-10-CM

## 2021-10-05 PROCEDURE — 1159F MED LIST DOCD IN RCRD: CPT | Mod: CPTII,S$GLB,, | Performed by: FAMILY MEDICINE

## 2021-10-05 PROCEDURE — 99999 PR PBB SHADOW E&M-EST. PATIENT-LVL III: CPT | Mod: PBBFAC,,, | Performed by: FAMILY MEDICINE

## 2021-10-05 PROCEDURE — 3008F BODY MASS INDEX DOCD: CPT | Mod: CPTII,S$GLB,, | Performed by: FAMILY MEDICINE

## 2021-10-05 PROCEDURE — 3008F PR BODY MASS INDEX (BMI) DOCUMENTED: ICD-10-PCS | Mod: CPTII,S$GLB,, | Performed by: FAMILY MEDICINE

## 2021-10-05 PROCEDURE — 3074F SYST BP LT 130 MM HG: CPT | Mod: CPTII,S$GLB,, | Performed by: FAMILY MEDICINE

## 2021-10-05 PROCEDURE — 1160F PR REVIEW ALL MEDS BY PRESCRIBER/CLIN PHARMACIST DOCUMENTED: ICD-10-PCS | Mod: CPTII,S$GLB,, | Performed by: FAMILY MEDICINE

## 2021-10-05 PROCEDURE — 3078F DIAST BP <80 MM HG: CPT | Mod: CPTII,S$GLB,, | Performed by: FAMILY MEDICINE

## 2021-10-05 PROCEDURE — 3074F PR MOST RECENT SYSTOLIC BLOOD PRESSURE < 130 MM HG: ICD-10-PCS | Mod: CPTII,S$GLB,, | Performed by: FAMILY MEDICINE

## 2021-10-05 PROCEDURE — 99214 OFFICE O/P EST MOD 30 MIN: CPT | Mod: S$GLB,,, | Performed by: FAMILY MEDICINE

## 2021-10-05 PROCEDURE — 99214 PR OFFICE/OUTPT VISIT, EST, LEVL IV, 30-39 MIN: ICD-10-PCS | Mod: S$GLB,,, | Performed by: FAMILY MEDICINE

## 2021-10-05 PROCEDURE — 3078F PR MOST RECENT DIASTOLIC BLOOD PRESSURE < 80 MM HG: ICD-10-PCS | Mod: CPTII,S$GLB,, | Performed by: FAMILY MEDICINE

## 2021-10-05 PROCEDURE — 1160F RVW MEDS BY RX/DR IN RCRD: CPT | Mod: CPTII,S$GLB,, | Performed by: FAMILY MEDICINE

## 2021-10-05 PROCEDURE — 1159F PR MEDICATION LIST DOCUMENTED IN MEDICAL RECORD: ICD-10-PCS | Mod: CPTII,S$GLB,, | Performed by: FAMILY MEDICINE

## 2021-10-05 PROCEDURE — 99999 PR PBB SHADOW E&M-EST. PATIENT-LVL III: ICD-10-PCS | Mod: PBBFAC,,, | Performed by: FAMILY MEDICINE

## 2021-10-05 RX ORDER — SERTRALINE HYDROCHLORIDE 100 MG/1
100 TABLET, FILM COATED ORAL DAILY
Qty: 90 TABLET | Refills: 0 | Status: SHIPPED | OUTPATIENT
Start: 2021-10-05 | End: 2022-01-06

## 2022-01-06 DIAGNOSIS — F40.10 SOCIAL ANXIETY DISORDER: ICD-10-CM

## 2022-01-06 RX ORDER — SERTRALINE HYDROCHLORIDE 100 MG/1
TABLET, FILM COATED ORAL
Qty: 30 TABLET | Refills: 0 | Status: SHIPPED | OUTPATIENT
Start: 2022-01-06 | End: 2022-01-21

## 2022-01-06 NOTE — TELEPHONE ENCOUNTER
This Rx Request does not qualify for assessment with the OR.    Please review protocol details and the Care Due Message for extra clinical information    Reasons Rx Request may be deferred:  1. Labs/Vitals overdue  2. Labs/Vitals abnormal  3. Patient has been seen in the ED/Hospital since the last PCP visit  4. Medication is non-delegated  5. Provider is a non-participating provider

## 2022-01-21 DIAGNOSIS — F40.10 SOCIAL ANXIETY DISORDER: ICD-10-CM

## 2022-01-21 RX ORDER — SERTRALINE HYDROCHLORIDE 100 MG/1
TABLET, FILM COATED ORAL
Qty: 30 TABLET | Refills: 0 | Status: SHIPPED | OUTPATIENT
Start: 2022-01-21

## 2022-04-10 ENCOUNTER — HISTORICAL (OUTPATIENT)
Dept: ADMINISTRATIVE | Facility: HOSPITAL | Age: 27
End: 2022-04-10
Payer: COMMERCIAL

## 2022-04-30 VITALS
WEIGHT: 194.25 LBS | DIASTOLIC BLOOD PRESSURE: 88 MMHG | HEIGHT: 77 IN | BODY MASS INDEX: 22.94 KG/M2 | OXYGEN SATURATION: 99 % | SYSTOLIC BLOOD PRESSURE: 148 MMHG

## 2023-04-24 ENCOUNTER — TELEPHONE (OUTPATIENT)
Dept: FAMILY MEDICINE | Facility: CLINIC | Age: 28
End: 2023-04-24
Payer: COMMERCIAL

## 2023-04-24 DIAGNOSIS — R41.840 ATTENTION DEFICIT: Primary | ICD-10-CM

## 2024-08-01 ENCOUNTER — OFFICE VISIT (OUTPATIENT)
Dept: FAMILY MEDICINE | Facility: CLINIC | Age: 29
End: 2024-08-01
Attending: FAMILY MEDICINE
Payer: COMMERCIAL

## 2024-08-01 ENCOUNTER — LAB VISIT (OUTPATIENT)
Dept: LAB | Facility: HOSPITAL | Age: 29
End: 2024-08-01
Attending: FAMILY MEDICINE
Payer: COMMERCIAL

## 2024-08-01 VITALS
BODY MASS INDEX: 23.76 KG/M2 | HEART RATE: 67 BPM | TEMPERATURE: 98 F | SYSTOLIC BLOOD PRESSURE: 111 MMHG | HEIGHT: 76 IN | WEIGHT: 195.13 LBS | DIASTOLIC BLOOD PRESSURE: 71 MMHG | OXYGEN SATURATION: 98 %

## 2024-08-01 DIAGNOSIS — Z72.0 SMOKELESS TOBACCO USE: ICD-10-CM

## 2024-08-01 DIAGNOSIS — R55 SYNCOPE, UNSPECIFIED SYNCOPE TYPE: ICD-10-CM

## 2024-08-01 DIAGNOSIS — R55 SYNCOPE, UNSPECIFIED SYNCOPE TYPE: Primary | ICD-10-CM

## 2024-08-01 DIAGNOSIS — R10.13 DYSPEPSIA: ICD-10-CM

## 2024-08-01 LAB
ALBUMIN SERPL BCP-MCNC: 4.8 G/DL (ref 3.5–5.2)
ALP SERPL-CCNC: 76 U/L (ref 55–135)
ALT SERPL W/O P-5'-P-CCNC: 20 U/L (ref 10–44)
ANION GAP SERPL CALC-SCNC: 10 MMOL/L (ref 8–16)
AST SERPL-CCNC: 22 U/L (ref 10–40)
BASOPHILS # BLD AUTO: 0.04 K/UL (ref 0–0.2)
BASOPHILS NFR BLD: 0.6 % (ref 0–1.9)
BILIRUB SERPL-MCNC: 0.5 MG/DL (ref 0.1–1)
BUN SERPL-MCNC: 11 MG/DL (ref 6–20)
CALCIUM SERPL-MCNC: 9.9 MG/DL (ref 8.7–10.5)
CHLORIDE SERPL-SCNC: 105 MMOL/L (ref 95–110)
CO2 SERPL-SCNC: 27 MMOL/L (ref 23–29)
CREAT SERPL-MCNC: 1.1 MG/DL (ref 0.5–1.4)
DIFFERENTIAL METHOD BLD: NORMAL
EOSINOPHIL # BLD AUTO: 0.2 K/UL (ref 0–0.5)
EOSINOPHIL NFR BLD: 3.9 % (ref 0–8)
ERYTHROCYTE [DISTWIDTH] IN BLOOD BY AUTOMATED COUNT: 12.4 % (ref 11.5–14.5)
EST. GFR  (NO RACE VARIABLE): >60 ML/MIN/1.73 M^2
ESTIMATED AVG GLUCOSE: 100 MG/DL (ref 68–131)
GLUCOSE SERPL-MCNC: 86 MG/DL (ref 70–110)
HBA1C MFR BLD: 5.1 % (ref 4–5.6)
HCT VFR BLD AUTO: 45.3 % (ref 40–54)
HGB BLD-MCNC: 15.6 G/DL (ref 14–18)
IMM GRANULOCYTES # BLD AUTO: 0.01 K/UL (ref 0–0.04)
IMM GRANULOCYTES NFR BLD AUTO: 0.2 % (ref 0–0.5)
LYMPHOCYTES # BLD AUTO: 2.1 K/UL (ref 1–4.8)
LYMPHOCYTES NFR BLD: 33.9 % (ref 18–48)
MCH RBC QN AUTO: 30.6 PG (ref 27–31)
MCHC RBC AUTO-ENTMCNC: 34.4 G/DL (ref 32–36)
MCV RBC AUTO: 89 FL (ref 82–98)
MONOCYTES # BLD AUTO: 0.6 K/UL (ref 0.3–1)
MONOCYTES NFR BLD: 8.9 % (ref 4–15)
NEUTROPHILS # BLD AUTO: 3.3 K/UL (ref 1.8–7.7)
NEUTROPHILS NFR BLD: 52.5 % (ref 38–73)
NRBC BLD-RTO: 0 /100 WBC
PLATELET # BLD AUTO: 306 K/UL (ref 150–450)
PMV BLD AUTO: 11.1 FL (ref 9.2–12.9)
POTASSIUM SERPL-SCNC: 4.2 MMOL/L (ref 3.5–5.1)
PROT SERPL-MCNC: 7.8 G/DL (ref 6–8.4)
RBC # BLD AUTO: 5.09 M/UL (ref 4.6–6.2)
SODIUM SERPL-SCNC: 142 MMOL/L (ref 136–145)
WBC # BLD AUTO: 6.2 K/UL (ref 3.9–12.7)

## 2024-08-01 PROCEDURE — 93010 ELECTROCARDIOGRAM REPORT: CPT | Mod: S$GLB,,, | Performed by: INTERNAL MEDICINE

## 2024-08-01 PROCEDURE — 3078F DIAST BP <80 MM HG: CPT | Mod: CPTII,S$GLB,, | Performed by: FAMILY MEDICINE

## 2024-08-01 PROCEDURE — 83036 HEMOGLOBIN GLYCOSYLATED A1C: CPT | Performed by: FAMILY MEDICINE

## 2024-08-01 PROCEDURE — 3008F BODY MASS INDEX DOCD: CPT | Mod: CPTII,S$GLB,, | Performed by: FAMILY MEDICINE

## 2024-08-01 PROCEDURE — 3074F SYST BP LT 130 MM HG: CPT | Mod: CPTII,S$GLB,, | Performed by: FAMILY MEDICINE

## 2024-08-01 PROCEDURE — 1159F MED LIST DOCD IN RCRD: CPT | Mod: CPTII,S$GLB,, | Performed by: FAMILY MEDICINE

## 2024-08-01 PROCEDURE — 93005 ELECTROCARDIOGRAM TRACING: CPT | Mod: S$GLB,,, | Performed by: FAMILY MEDICINE

## 2024-08-01 PROCEDURE — 99999 PR PBB SHADOW E&M-EST. PATIENT-LVL IV: CPT | Mod: PBBFAC,,, | Performed by: FAMILY MEDICINE

## 2024-08-01 PROCEDURE — 85025 COMPLETE CBC W/AUTO DIFF WBC: CPT | Performed by: FAMILY MEDICINE

## 2024-08-01 PROCEDURE — 99214 OFFICE O/P EST MOD 30 MIN: CPT | Mod: S$GLB,,, | Performed by: FAMILY MEDICINE

## 2024-08-01 PROCEDURE — 86677 HELICOBACTER PYLORI ANTIBODY: CPT | Performed by: FAMILY MEDICINE

## 2024-08-01 PROCEDURE — 36415 COLL VENOUS BLD VENIPUNCTURE: CPT | Mod: PO | Performed by: FAMILY MEDICINE

## 2024-08-01 PROCEDURE — 1160F RVW MEDS BY RX/DR IN RCRD: CPT | Mod: CPTII,S$GLB,, | Performed by: FAMILY MEDICINE

## 2024-08-01 PROCEDURE — 80053 COMPREHEN METABOLIC PANEL: CPT | Performed by: FAMILY MEDICINE

## 2024-08-01 RX ORDER — PANTOPRAZOLE SODIUM 40 MG/1
40 TABLET, DELAYED RELEASE ORAL DAILY
Qty: 30 TABLET | Refills: 0 | Status: SHIPPED | OUTPATIENT
Start: 2024-08-01 | End: 2025-08-01

## 2024-08-01 NOTE — PROGRESS NOTES
Subjective:       Patient ID: Stiven Arce is a 28 y.o. male.    Chief Complaint: Annual Exam    28 y old male who over the last 6 m has had 2 syncopal episodes  . The 1st one occur shortly  after standing  up . He felt lightheaded  and fell backward . He had a brief LOC . The second time occur while he was working  in the heat and took a heavy back pack off .Also with nausea and heart burn almost every day over the last 6 m  . No weight loss, dysphagia , early satiety . Not taking NSAID regularly , seldom drinks etoh . He chews tobacco     Review of Systems   Constitutional: Negative.    HENT: Negative.     Eyes: Negative.    Respiratory: Negative.     Cardiovascular: Negative.    Gastrointestinal:  Positive for nausea.   Genitourinary: Negative.    Musculoskeletal: Negative.    Skin: Negative.    Hematological: Negative.        Objective:      Physical Exam  Constitutional:       General: He is not in acute distress.     Appearance: He is well-developed. He is not diaphoretic.   HENT:      Head: Normocephalic and atraumatic.      Right Ear: External ear normal.      Left Ear: External ear normal.      Nose: Nose normal.      Mouth/Throat:      Pharynx: No oropharyngeal exudate.   Eyes:      General: No scleral icterus.        Right eye: No discharge.         Left eye: No discharge.      Conjunctiva/sclera: Conjunctivae normal.      Pupils: Pupils are equal, round, and reactive to light.   Neck:      Thyroid: No thyromegaly.      Vascular: No JVD.      Trachea: No tracheal deviation.   Cardiovascular:      Rate and Rhythm: Normal rate and regular rhythm.      Heart sounds: Normal heart sounds. No murmur heard.     No friction rub. No gallop.   Pulmonary:      Effort: Pulmonary effort is normal. No respiratory distress.      Breath sounds: Normal breath sounds. No stridor. No wheezing or rales.   Chest:      Chest wall: No tenderness.   Abdominal:      General: Bowel sounds are normal. There is no  distension.      Palpations: Abdomen is soft.      Tenderness: There is no abdominal tenderness. There is no guarding or rebound.   Musculoskeletal:         General: No tenderness. Normal range of motion.      Cervical back: Normal range of motion and neck supple.   Lymphadenopathy:      Cervical: No cervical adenopathy.   Skin:     General: Skin is warm and dry.      Coloration: Skin is not pale.      Findings: No erythema or rash.   Neurological:      Mental Status: He is alert and oriented to person, place, and time.      Cranial Nerves: No cranial nerve deficit.      Motor: No abnormal muscle tone.      Coordination: Coordination normal.      Deep Tendon Reflexes: Reflexes are normal and symmetric. Reflexes normal.   Psychiatric:         Behavior: Behavior normal.         Thought Content: Thought content normal.         Judgment: Judgment normal.       Assessment:       Stiven was seen today for annual exam.    Diagnoses and all orders for this visit:    Syncope, unspecified syncope type  -     CBC Auto Differential; Future  -     Comprehensive Metabolic Panel; Future  -     Hemoglobin A1C; Future  -     Lipid Panel; Future  -     IN OFFICE EKG 12-LEAD (to Muse)    Dyspepsia  -     H. pylori Antibody, IgG; Future    Smokeless tobacco use    Other orders  -     pantoprazole (PROTONIX) 40 MG tablet; Take 1 tablet (40 mg total) by mouth once daily.       Plan:   Neg orthostatics   We will continue to follow up   Work on cessation

## 2024-08-02 ENCOUNTER — TELEPHONE (OUTPATIENT)
Dept: FAMILY MEDICINE | Facility: CLINIC | Age: 29
End: 2024-08-02
Payer: COMMERCIAL

## 2024-08-02 LAB
H PYLORI IGG SERPL QL IA: NEGATIVE
OHS QRS DURATION: 102 MS
OHS QTC CALCULATION: 371 MS

## 2024-08-02 NOTE — TELEPHONE ENCOUNTER
----- Message from Anastasia Salas sent at 8/2/2024 12:25 PM CDT -----  Contact: Patient  Type:  Patient Returning Call    Who Called:Stiven Arce   Who Left Message for Patient: nurse  Does the patient know what this is regarding?: results   Would the patient rather a call back or a response via [facility]?  Call   Best Call Back Number: [phone]   Additional Information:

## 2024-08-15 ENCOUNTER — OFFICE VISIT (OUTPATIENT)
Dept: CARDIOLOGY | Facility: CLINIC | Age: 29
End: 2024-08-15
Payer: COMMERCIAL

## 2024-08-15 VITALS
OXYGEN SATURATION: 98 % | DIASTOLIC BLOOD PRESSURE: 78 MMHG | SYSTOLIC BLOOD PRESSURE: 128 MMHG | BODY MASS INDEX: 23.94 KG/M2 | WEIGHT: 196.63 LBS | HEART RATE: 93 BPM

## 2024-08-15 DIAGNOSIS — R94.31 ABNORMAL EKG: ICD-10-CM

## 2024-08-15 DIAGNOSIS — R94.31 SHORTENED PR INTERVAL: ICD-10-CM

## 2024-08-15 DIAGNOSIS — R55 SYNCOPE AND COLLAPSE: ICD-10-CM

## 2024-08-15 DIAGNOSIS — I45.10 RBBB: ICD-10-CM

## 2024-08-15 DIAGNOSIS — F17.220 TOBACCO DEPENDENCE DUE TO CHEWING TOBACCO: Primary | ICD-10-CM

## 2024-08-15 PROCEDURE — 93005 ELECTROCARDIOGRAM TRACING: CPT

## 2024-08-15 PROCEDURE — 99999 PR PBB SHADOW E&M-EST. PATIENT-LVL III: CPT | Mod: PBBFAC,,, | Performed by: INTERNAL MEDICINE

## 2024-08-15 NOTE — PROGRESS NOTES
Subjective:   Patient ID:  Stiven Arce is a 28 y.o. male who presents for evaluation of No chief complaint on file.      HPI  27 yo, dips , occasional marijuana ,   First time did smoke marijuana, at 930 pm , at home, on his couch and went to go to the bathroom and was walking in the hallway, lightheaded and remembers feeling lightheaded, was feeling anxious in couch, had LOC for 30 seconds . Also remembers taking a gummy that day     Second time was 4 weeks ago , was 11 am , was spraying his yard and had a bAG BACK, and squatted down to lay the sprayer bag back , had a energy drink that morning, no breakfast . As soon as he came up from squatting he passed out for 30 seconds.     Passed out twice in the past 6 months     Past Medical History:   Diagnosis Date    Anxiety        Past Surgical History:   Procedure Laterality Date    WISDOM TOOTH EXTRACTION         Social History     Tobacco Use    Smoking status: Never    Smokeless tobacco: Current     Types: Chew   Substance Use Topics    Alcohol use: Yes    Drug use: Yes     Types: Marijuana       Family History   Problem Relation Name Age of Onset    Anxiety disorder Mother      No Known Problems Father      Diabetes Maternal Grandfather      Heart disease Maternal Grandfather          Valvulopathy , CAD ?    Dementia Paternal Grandfather         Review of Systems   Cardiovascular:  Positive for syncope. Negative for chest pain, dyspnea on exertion and palpitations.   Genitourinary: Negative.        Current Outpatient Medications on File Prior to Visit   Medication Sig    pantoprazole (PROTONIX) 40 MG tablet Take 1 tablet (40 mg total) by mouth once daily.     No current facility-administered medications on file prior to visit.       Objective:   Objective:  Wt Readings from Last 3 Encounters:   08/15/24 89.2 kg (196 lb 10.4 oz)   08/01/24 88.5 kg (195 lb 1.7 oz)   09/27/19 88.1 kg (194 lb 3.6 oz)     Temp Readings from Last 3 Encounters:   08/01/24  "98.4 °F (36.9 °C)   10/05/21 97.8 °F (36.6 °C)   12/15/20 99.7 °F (37.6 °C)     BP Readings from Last 3 Encounters:   08/15/24 128/78   08/01/24 111/71   09/27/19 (!) 148/88     Pulse Readings from Last 3 Encounters:   08/15/24 93   08/01/24 67   10/05/21 67       Physical Exam  Vitals reviewed.   Constitutional:       Appearance: He is well-developed.   Neck:      Vascular: No carotid bruit.   Cardiovascular:      Rate and Rhythm: Normal rate and regular rhythm.      Pulses: Intact distal pulses.      Heart sounds: Normal heart sounds. No murmur heard.  Pulmonary:      Breath sounds: Normal breath sounds.   Neurological:      Mental Status: He is oriented to person, place, and time.         No results found for: "CHOL"  No results found for: "HDL"  No results found for: "LDLCALC"  No results found for: "TRIG"  No results found for: "CHOLHDL"    Chemistry        Component Value Date/Time     08/01/2024 1609    K 4.2 08/01/2024 1609     08/01/2024 1609    CO2 27 08/01/2024 1609    BUN 11 08/01/2024 1609    CREATININE 1.1 08/01/2024 1609    GLU 86 08/01/2024 1609        Component Value Date/Time    CALCIUM 9.9 08/01/2024 1609    ALKPHOS 76 08/01/2024 1609    AST 22 08/01/2024 1609    ALT 20 08/01/2024 1609    BILITOT 0.5 08/01/2024 1609          No results found for: "TSH"  No results found for: "INR", "PROTIME"  Lab Results   Component Value Date    WBC 6.20 08/01/2024    HGB 15.6 08/01/2024    HCT 45.3 08/01/2024    MCV 89 08/01/2024     08/01/2024     BNP  @LABRCNTIP(BNP,BNPTRIAGEBLO)@  CrCl cannot be calculated (Patient's most recent lab result is older than the maximum 7 days allowed.).     Imaging:  ======    No results found for this or any previous visit.    No results found for this or any previous visit.    No results found for this or any previous visit.    No results found for this or any previous visit.    No valid procedures specified.    No results found for this or any previous " visit.      No results found for this or any previous visit.      No results found for this or any previous visit.      Diagnostic Results:  ECG: Reviewed    Sinus bradycardia   Right ventricular conduction delay   Borderline Abnormal ECG   No previous ECGs available   Confirmed by MD RASHEL, HERMELINDO ([id number]) on 8/2/2024 10:18:44 AM     Normal sinus rhythm T wave abnormality, consider inferior ischemia Abnormal ECG When compared with ECG of 15-AUG-2024 16:05,  The ASCVD Risk score (Genesis DK, et al., 2019) failed to calculate for the following reasons:    The 2019 ASCVD risk score is only valid for ages 40 to 79        Assessment and Plan:   Tobacco dependence due to chewing tobacco    Syncope and collapse  -     IN OFFICE EKG 12-LEAD (to Muse)  -     Cardiac Monitor - 3-15 Day Adult (Cupid Only); Future  -     Echo; Future  -     IN OFFICE EKG 12-LEAD (to Muse)  -     Exercise Stress - EKG; Future    RBBB  -     IN OFFICE EKG 12-LEAD (to Muse)  -     Cardiac Monitor - 3-15 Day Adult (Cupid Only); Future  -     Echo; Future    Shortened WA interval  -     Cardiac Monitor - 3-15 Day Adult (Cupid Only); Future  -     IN OFFICE EKG 12-LEAD (to Muse)    Abnormal EKG          Reviewed all tests and above medical conditions with patient in detail and formulated treatment plan.  Counseled against use of marijuana and chewing tobacco.  I reviewed his EKG from August 1st.  Has a borderline short WA.  There was some delta wave by however not flagrant enough to make a diagnosis of WPW.  His repeat EKG today however does not show that same pattern or the right ventricular conduction delay.  Rather shows inverted T-waves almost like in hypertrophic cardiomyopathy.  We will get an echocardiogram, stress test and cardiac monitor for 1 week..    Follow up in  3 months

## 2024-08-16 LAB
OHS QRS DURATION: 98 MS
OHS QTC CALCULATION: 380 MS

## 2024-09-03 ENCOUNTER — PATIENT MESSAGE (OUTPATIENT)
Dept: FAMILY MEDICINE | Facility: CLINIC | Age: 29
End: 2024-09-03
Payer: COMMERCIAL

## 2024-09-04 ENCOUNTER — DOCUMENTATION ONLY (OUTPATIENT)
Dept: CARDIOLOGY | Facility: CLINIC | Age: 29
End: 2024-09-04
Payer: COMMERCIAL

## 2024-09-04 ENCOUNTER — HOSPITAL ENCOUNTER (OUTPATIENT)
Dept: CARDIOLOGY | Facility: HOSPITAL | Age: 29
Discharge: HOME | End: 2024-09-04
Attending: INTERNAL MEDICINE
Payer: COMMERCIAL

## 2024-09-04 VITALS
BODY MASS INDEX: 23.87 KG/M2 | SYSTOLIC BLOOD PRESSURE: 117 MMHG | WEIGHT: 196 LBS | DIASTOLIC BLOOD PRESSURE: 82 MMHG | HEIGHT: 76 IN

## 2024-09-04 VITALS
HEIGHT: 76 IN | DIASTOLIC BLOOD PRESSURE: 82 MMHG | SYSTOLIC BLOOD PRESSURE: 117 MMHG | BODY MASS INDEX: 23.87 KG/M2 | WEIGHT: 196 LBS | HEART RATE: 77 BPM

## 2024-09-04 DIAGNOSIS — I45.10 RBBB: ICD-10-CM

## 2024-09-04 DIAGNOSIS — R55 SYNCOPE AND COLLAPSE: ICD-10-CM

## 2024-09-04 DIAGNOSIS — R94.31 SHORTENED PR INTERVAL: ICD-10-CM

## 2024-09-04 LAB
AORTIC ROOT ANNULUS: 2.53 CM
ASCENDING AORTA: 2.64 CM
AV INDEX (PROSTH): 0.8
AV MEAN GRADIENT: 5 MMHG
AV PEAK GRADIENT: 9 MMHG
AV VALVE AREA BY VELOCITY RATIO: 2.52 CM²
AV VALVE AREA: 2.41 CM²
AV VELOCITY RATIO: 0.84
BSA FOR ECHO PROCEDURE: 2.18 M2
CV ECHO LV RWT: 0.33 CM
CV STRESS BASE HR: 88 BPM
DIASTOLIC BLOOD PRESSURE: 82 MMHG
DOP CALC AO PEAK VEL: 1.47 M/S
DOP CALC AO VTI: 26.4 CM
DOP CALC LVOT AREA: 3 CM2
DOP CALC LVOT DIAMETER: 1.96 CM
DOP CALC LVOT PEAK VEL: 1.23 M/S
DOP CALC LVOT STROKE VOLUME: 63.63 CM3
DOP CALC RVOT PEAK VEL: 0.78 M/S
DOP CALC RVOT VTI: 15.5 CM
DOP CALCLVOT PEAK VEL VTI: 21.1 CM
E WAVE DECELERATION TIME: 232.32 MSEC
E/A RATIO: 1.52
E/E' RATIO: 6.56 M/S
ECHO LV POSTERIOR WALL: 0.71 CM (ref 0.6–1.1)
EJECTION FRACTION: 60 %
FRACTIONAL SHORTENING: 32 % (ref 28–44)
INTERVENTRICULAR SEPTUM: 0.84 CM (ref 0.6–1.1)
IVRT: 85.63 MSEC
LA MAJOR: 3.86 CM
LA MINOR: 3.29 CM
LA WIDTH: 2.8 CM
LEFT ATRIUM AREA SYSTOLIC (APICAL 2 CHAMBER): 9.27 CM2
LEFT ATRIUM AREA SYSTOLIC (APICAL 4 CHAMBER): 8.84 CM2
LEFT ATRIUM SIZE: 2.5 CM
LEFT ATRIUM VOLUME INDEX MOD: 7.5 ML/M2
LEFT ATRIUM VOLUME INDEX: 9.6 ML/M2
LEFT ATRIUM VOLUME MOD: 16.53 CM3
LEFT ATRIUM VOLUME: 21.14 CM3
LEFT INTERNAL DIMENSION IN SYSTOLE: 2.97 CM (ref 2.1–4)
LEFT VENTRICLE DIASTOLIC VOLUME INDEX: 38.91 ML/M2
LEFT VENTRICLE DIASTOLIC VOLUME: 85.61 ML
LEFT VENTRICLE END SYSTOLIC VOLUME APICAL 2 CHAMBER: 17.76 ML
LEFT VENTRICLE END SYSTOLIC VOLUME APICAL 4 CHAMBER: 15.45 ML
LEFT VENTRICLE MASS INDEX: 47 G/M2
LEFT VENTRICLE SYSTOLIC VOLUME INDEX: 15.5 ML/M2
LEFT VENTRICLE SYSTOLIC VOLUME: 34.02 ML
LEFT VENTRICULAR INTERNAL DIMENSION IN DIASTOLE: 4.36 CM (ref 3.5–6)
LEFT VENTRICULAR MASS: 103.39 G
LV LATERAL E/E' RATIO: 5.47 M/S
LV SEPTAL E/E' RATIO: 8.2 M/S
LVED V (TEICH): 85.61 ML
LVES V (TEICH): 34.02 ML
LVOT MG: 3.6 MMHG
LVOT MV: 0.9 CM/S
MV PEAK A VEL: 0.54 M/S
MV PEAK E VEL: 0.82 M/S
MV STENOSIS PRESSURE HALF TIME: 67.37 MS
MV VALVE AREA P 1/2 METHOD: 3.27 CM2
OHS CV CPX 1 MINUTE RECOVERY HEART RATE: 136 BPM
OHS CV CPX 85 PERCENT MAX PREDICTED HEART RATE MALE: 165
OHS CV CPX ESTIMATED METS: 13
OHS CV CPX MAX PREDICTED HEART RATE: 194
OHS CV CPX PATIENT IS FEMALE: 0
OHS CV CPX PATIENT IS MALE: 1
OHS CV CPX PEAK DIASTOLIC BLOOD PRESSURE: 83 MMHG
OHS CV CPX PEAK HEAR RATE: 179 BPM
OHS CV CPX PEAK RATE PRESSURE PRODUCT: NORMAL
OHS CV CPX PEAK SYSTOLIC BLOOD PRESSURE: 211 MMHG
OHS CV CPX PERCENT MAX PREDICTED HEART RATE ACHIEVED: 92
OHS CV CPX RATE PRESSURE PRODUCT PRESENTING: NORMAL
OHS CV RV/LV RATIO: 0.63 CM
PISA TR MAX VEL: 2.13 M/S
PULM VEIN S/D RATIO: 0.57
PV MEAN GRADIENT: 1 MMHG
PV PEAK D VEL: 0.68 M/S
PV PEAK GRADIENT: 5 MMHG
PV PEAK S VEL: 0.39 M/S
PV PEAK VELOCITY: 1.09 M/S
RA MAJOR: 3.66 CM
RA PRESSURE ESTIMATED: 3 MMHG
RA WIDTH: 2.53 CM
RIGHT VENTRICULAR END-DIASTOLIC DIMENSION: 2.75 CM
RV TB RVSP: 5 MMHG
SINUS: 2.56 CM
STJ: 2.31 CM
STRESS ECHO POST EXERCISE DUR MIN: 10 MINUTES
STRESS ECHO POST EXERCISE DUR SEC: 1 SECONDS
SYSTOLIC BLOOD PRESSURE: 117 MMHG
TDI LATERAL: 0.15 M/S
TDI SEPTAL: 0.1 M/S
TDI: 0.13 M/S
TR MAX PG: 18 MMHG
TV REST PULMONARY ARTERY PRESSURE: 21 MMHG
Z-SCORE OF LEFT VENTRICULAR DIMENSION IN END DIASTOLE: -5.44
Z-SCORE OF LEFT VENTRICULAR DIMENSION IN END SYSTOLE: -3.4

## 2024-09-04 PROCEDURE — 93017 CV STRESS TEST TRACING ONLY: CPT

## 2024-09-04 PROCEDURE — 93016 CV STRESS TEST SUPVJ ONLY: CPT | Mod: ,,, | Performed by: INTERNAL MEDICINE

## 2024-09-04 PROCEDURE — 93306 TTE W/DOPPLER COMPLETE: CPT | Mod: 26,,, | Performed by: INTERNAL MEDICINE

## 2024-09-04 PROCEDURE — 93306 TTE W/DOPPLER COMPLETE: CPT

## 2024-09-04 PROCEDURE — 93018 CV STRESS TEST I&R ONLY: CPT | Mod: ,,, | Performed by: INTERNAL MEDICINE

## 2024-09-04 NOTE — PROGRESS NOTES
Pt presented for an echocardiogram with bubble study per Dr. Rodríguez.  Procedure was explained to the patient, he verbalized understanding. .  Patient tolerated the procedure well.  IV discontinued, pressure dressing applied.

## 2024-09-10 ENCOUNTER — OFFICE VISIT (OUTPATIENT)
Dept: FAMILY MEDICINE | Facility: CLINIC | Age: 29
End: 2024-09-10
Attending: FAMILY MEDICINE
Payer: COMMERCIAL

## 2024-09-10 VITALS
WEIGHT: 198.63 LBS | HEIGHT: 76 IN | BODY MASS INDEX: 24.19 KG/M2 | HEART RATE: 74 BPM | TEMPERATURE: 98 F | OXYGEN SATURATION: 99 % | SYSTOLIC BLOOD PRESSURE: 124 MMHG | DIASTOLIC BLOOD PRESSURE: 80 MMHG

## 2024-09-10 DIAGNOSIS — F98.8 ATTENTION DEFICIT DISORDER, UNSPECIFIED HYPERACTIVITY PRESENCE: Primary | ICD-10-CM

## 2024-09-10 PROCEDURE — 3074F SYST BP LT 130 MM HG: CPT | Mod: CPTII,S$GLB,, | Performed by: FAMILY MEDICINE

## 2024-09-10 PROCEDURE — 3008F BODY MASS INDEX DOCD: CPT | Mod: CPTII,S$GLB,, | Performed by: FAMILY MEDICINE

## 2024-09-10 PROCEDURE — 99999 PR PBB SHADOW E&M-EST. PATIENT-LVL III: CPT | Mod: PBBFAC,,, | Performed by: FAMILY MEDICINE

## 2024-09-10 PROCEDURE — 3079F DIAST BP 80-89 MM HG: CPT | Mod: CPTII,S$GLB,, | Performed by: FAMILY MEDICINE

## 2024-09-10 PROCEDURE — 3044F HG A1C LEVEL LT 7.0%: CPT | Mod: CPTII,S$GLB,, | Performed by: FAMILY MEDICINE

## 2024-09-10 PROCEDURE — 99214 OFFICE O/P EST MOD 30 MIN: CPT | Mod: S$GLB,,, | Performed by: FAMILY MEDICINE

## 2024-09-10 PROCEDURE — 1160F RVW MEDS BY RX/DR IN RCRD: CPT | Mod: CPTII,S$GLB,, | Performed by: FAMILY MEDICINE

## 2024-09-10 PROCEDURE — 1159F MED LIST DOCD IN RCRD: CPT | Mod: CPTII,S$GLB,, | Performed by: FAMILY MEDICINE

## 2024-09-10 RX ORDER — DEXTROAMPHETAMINE SACCHARATE, AMPHETAMINE ASPARTATE, DEXTROAMPHETAMINE SULFATE AND AMPHETAMINE SULFATE 5; 5; 5; 5 MG/1; MG/1; MG/1; MG/1
1 TABLET ORAL DAILY
Qty: 30 TABLET | Refills: 0 | Status: SHIPPED | OUTPATIENT
Start: 2024-09-10

## 2024-09-10 NOTE — PROGRESS NOTES
Subjective:       Patient ID: Stiven Arce is a 28 y.o. male.    Chief Complaint: Medication Refill    28 y old male diagnosed with ADD by Argentina Martinez PhD  .here to discuss treatment . 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 He did try one of her co workers Adderall 20 mg IR which worked well. He also has seen Card due to syncopal episodes . Denies any other syncopal episode     Medication Refill      Review of Systems   Constitutional: Negative.    HENT: Negative.     Eyes: Negative.    Respiratory: Negative.     Cardiovascular: Negative.    Gastrointestinal: Negative.    Genitourinary: Negative.    Musculoskeletal: Negative.    Skin: Negative.    Hematological: Negative.    Psychiatric/Behavioral:  Positive for decreased concentration.        Objective:      Physical Exam  Constitutional:       General: He is not in acute distress.     Appearance: He is well-developed. He is not diaphoretic.   HENT:      Head: Normocephalic and atraumatic.      Right Ear: External ear normal.      Left Ear: External ear normal.      Nose: Nose normal.      Mouth/Throat:      Pharynx: No oropharyngeal exudate.   Eyes:      General: No scleral icterus.        Right eye: No discharge.         Left eye: No discharge.      Conjunctiva/sclera: Conjunctivae normal.      Pupils: Pupils are equal, round, and reactive to light.   Neck:      Thyroid: No thyromegaly.      Vascular: No JVD.      Trachea: No tracheal deviation.   Cardiovascular:      Rate and Rhythm: Normal rate and regular rhythm.      Heart sounds: Normal heart sounds. No murmur heard.     No friction rub. No gallop.   Pulmonary:      Effort: Pulmonary effort is normal. No respiratory distress.      Breath sounds: Normal breath sounds. No stridor. No wheezing or rales.   Chest:      Chest wall: No tenderness.   Abdominal:      General: Bowel sounds are normal. There is no distension.       Palpations: Abdomen is soft.      Tenderness: There is no abdominal tenderness. There is no guarding or rebound.   Musculoskeletal:         General: No tenderness. Normal range of motion.      Cervical back: Normal range of motion and neck supple.   Lymphadenopathy:      Cervical: No cervical adenopathy.   Skin:     General: Skin is warm and dry.      Coloration: Skin is not pale.      Findings: No erythema or rash.   Neurological:      Mental Status: He is alert and oriented to person, place, and time.      Cranial Nerves: No cranial nerve deficit.      Motor: No abnormal muscle tone.      Coordination: Coordination normal.      Deep Tendon Reflexes: Reflexes are normal and symmetric. Reflexes normal.   Psychiatric:         Behavior: Behavior normal.         Thought Content: Thought content normal.         Judgment: Judgment normal.         Assessment:       1. Attention deficit disorder, unspecified hyperactivity presence        Plan:     Stiven was seen today for medication refill.    Diagnoses and all orders for this visit:    Attention deficit disorder, unspecified hyperactivity presence    Other orders  -     dextroamphetamine-amphetamine (ADDERALL) 20 mg tablet; Take 1 tablet by mouth once daily.     Start Adderall .   Side effects discussed.   N.v in 1-2 w for BP check   F.u in 6 m

## 2024-09-12 ENCOUNTER — TELEPHONE (OUTPATIENT)
Dept: CARDIOLOGY | Facility: CLINIC | Age: 29
End: 2024-09-12
Payer: COMMERCIAL

## 2024-09-12 DIAGNOSIS — R94.31 SHORTENED PR INTERVAL: ICD-10-CM

## 2024-09-12 DIAGNOSIS — R55 SYNCOPE AND COLLAPSE: Primary | ICD-10-CM

## 2024-09-12 NOTE — TELEPHONE ENCOUNTER
Spoke with pt in regards to scheduling EP appt. Pt was scheduled for next available.                 ----- Message from Pancho Rodríguez MD sent at 9/12/2024 10:59 AM CDT -----  DISCUSSED OVER THE PHONE.  AWAITING CARDIAC MONITOR RESULTS.    PLEASE REFER TO DR. AGUERO FOR EP VISIT.    REFERRAL ORDER IS IN.    THANK YOU

## 2024-10-10 ENCOUNTER — OFFICE VISIT (OUTPATIENT)
Dept: FAMILY MEDICINE | Facility: CLINIC | Age: 29
End: 2024-10-10
Attending: FAMILY MEDICINE
Payer: COMMERCIAL

## 2024-10-10 VITALS
OXYGEN SATURATION: 98 % | BODY MASS INDEX: 24.63 KG/M2 | TEMPERATURE: 97 F | HEART RATE: 65 BPM | DIASTOLIC BLOOD PRESSURE: 70 MMHG | SYSTOLIC BLOOD PRESSURE: 124 MMHG | WEIGHT: 202.38 LBS

## 2024-10-10 DIAGNOSIS — F98.8 ATTENTION DEFICIT DISORDER, UNSPECIFIED TYPE: Primary | ICD-10-CM

## 2024-10-10 PROCEDURE — 3044F HG A1C LEVEL LT 7.0%: CPT | Mod: CPTII,S$GLB,, | Performed by: FAMILY MEDICINE

## 2024-10-10 PROCEDURE — 99999 PR PBB SHADOW E&M-EST. PATIENT-LVL III: CPT | Mod: PBBFAC,,, | Performed by: FAMILY MEDICINE

## 2024-10-10 PROCEDURE — 3008F BODY MASS INDEX DOCD: CPT | Mod: CPTII,S$GLB,, | Performed by: FAMILY MEDICINE

## 2024-10-10 PROCEDURE — 3078F DIAST BP <80 MM HG: CPT | Mod: CPTII,S$GLB,, | Performed by: FAMILY MEDICINE

## 2024-10-10 PROCEDURE — 1160F RVW MEDS BY RX/DR IN RCRD: CPT | Mod: CPTII,S$GLB,, | Performed by: FAMILY MEDICINE

## 2024-10-10 PROCEDURE — 3074F SYST BP LT 130 MM HG: CPT | Mod: CPTII,S$GLB,, | Performed by: FAMILY MEDICINE

## 2024-10-10 PROCEDURE — 1159F MED LIST DOCD IN RCRD: CPT | Mod: CPTII,S$GLB,, | Performed by: FAMILY MEDICINE

## 2024-10-10 PROCEDURE — 99214 OFFICE O/P EST MOD 30 MIN: CPT | Mod: S$GLB,,, | Performed by: FAMILY MEDICINE

## 2024-10-10 RX ORDER — DEXTROAMPHETAMINE SACCHARATE, AMPHETAMINE ASPARTATE, DEXTROAMPHETAMINE SULFATE AND AMPHETAMINE SULFATE 2.5; 2.5; 2.5; 2.5 MG/1; MG/1; MG/1; MG/1
TABLET ORAL
Qty: 30 TABLET | Refills: 0 | Status: SHIPPED | OUTPATIENT
Start: 2024-10-10

## 2024-10-10 NOTE — PROGRESS NOTES
Subjective:       Patient ID: Stiven Arce is a 28 y.o. male.    Chief Complaint: Medication Refill    28 y old male with ADD here for f.u after starting adderall 20 mg IR . Focus wears off at noon . No insomnia , no anorexia, mood disorders     Medication Refill      Review of Systems   Constitutional: Negative.    HENT: Negative.     Eyes: Negative.    Respiratory: Negative.     Cardiovascular: Negative.    Gastrointestinal: Negative.    Genitourinary: Negative.    Musculoskeletal: Negative.    Skin: Negative.    Hematological: Negative.    Psychiatric/Behavioral:  Positive for decreased concentration.        Objective:      Physical Exam  Constitutional:       General: He is not in acute distress.     Appearance: He is well-developed. He is not diaphoretic.   HENT:      Head: Normocephalic and atraumatic.      Right Ear: External ear normal.      Left Ear: External ear normal.      Nose: Nose normal.      Mouth/Throat:      Pharynx: No oropharyngeal exudate.   Eyes:      General: No scleral icterus.        Right eye: No discharge.         Left eye: No discharge.      Conjunctiva/sclera: Conjunctivae normal.      Pupils: Pupils are equal, round, and reactive to light.   Neck:      Thyroid: No thyromegaly.      Vascular: No JVD.      Trachea: No tracheal deviation.   Cardiovascular:      Rate and Rhythm: Normal rate and regular rhythm.      Heart sounds: Normal heart sounds. No murmur heard.     No friction rub. No gallop.   Pulmonary:      Effort: Pulmonary effort is normal. No respiratory distress.      Breath sounds: Normal breath sounds. No stridor. No wheezing or rales.   Chest:      Chest wall: No tenderness.   Abdominal:      General: Bowel sounds are normal. There is no distension.      Palpations: Abdomen is soft.      Tenderness: There is no abdominal tenderness. There is no guarding or rebound.   Musculoskeletal:         General: No tenderness. Normal range of motion.      Cervical back: Normal  range of motion and neck supple.   Lymphadenopathy:      Cervical: No cervical adenopathy.   Skin:     General: Skin is warm and dry.      Coloration: Skin is not pale.      Findings: No erythema or rash.   Neurological:      Mental Status: He is alert and oriented to person, place, and time.      Cranial Nerves: No cranial nerve deficit.      Motor: No abnormal muscle tone.      Coordination: Coordination normal.      Deep Tendon Reflexes: Reflexes are normal and symmetric. Reflexes normal.   Psychiatric:         Behavior: Behavior normal.         Thought Content: Thought content normal.         Judgment: Judgment normal.         Assessment:       1. Attention deficit disorder, unspecified type        Plan:     Stiven was seen today for medication refill.    Diagnoses and all orders for this visit:    Attention deficit disorder, unspecified type    Other orders  -     dextroamphetamine-amphetamine (ADDERALL) 10 mg Tab; 1 tab po qd at noon .     Start Adderall 10 mg XR at noon .   Contact us with progress in 1 week

## 2024-10-14 RX ORDER — DEXTROAMPHETAMINE SACCHARATE, AMPHETAMINE ASPARTATE, DEXTROAMPHETAMINE SULFATE AND AMPHETAMINE SULFATE 5; 5; 5; 5 MG/1; MG/1; MG/1; MG/1
1 TABLET ORAL DAILY
Qty: 30 TABLET | Refills: 0 | Status: SHIPPED | OUTPATIENT
Start: 2024-10-14

## 2024-10-14 NOTE — TELEPHONE ENCOUNTER
----- Message from Rupal Arriaza MD sent at 10/14/2024  8:08 AM CDT -----  ?  ----- Message -----  From: Mylene Gaitan LPN  Sent: 10/11/2024   4:13 PM CDT  To: Rupal Arriaza MD      ----- Message -----  From: Gladys Pimentel  Sent: 10/11/2024   4:09 PM CDT  To: [facility] Staff    Pt is calling in regards to the refill on dextroamphetamine-amphetamine (ADDERALL) 20 mg tablet.please call back at .[phone]         .  [id number] - [place] - [address] [facility] [address]  [address]  [facility] [address]  Phone: [phone] Fax: [phone]    Thanks  DAYDAY

## 2024-10-14 NOTE — TELEPHONE ENCOUNTER
No care due was identified.  Health [facility] Embedded Care Due Messages. Reference number: [id number].   10/14/2024 10:00:02 AM CDT

## 2024-11-10 DIAGNOSIS — F98.8 ATTENTION DEFICIT DISORDER, UNSPECIFIED TYPE: Primary | ICD-10-CM

## 2024-11-10 NOTE — TELEPHONE ENCOUNTER
No care due was identified.  Health [facility] Embedded Care Due Messages. Reference number: [id number].   11/10/2024 7:50:45 AM CST

## 2024-11-11 RX ORDER — DEXTROAMPHETAMINE SACCHARATE, AMPHETAMINE ASPARTATE, DEXTROAMPHETAMINE SULFATE AND AMPHETAMINE SULFATE 2.5; 2.5; 2.5; 2.5 MG/1; MG/1; MG/1; MG/1
TABLET ORAL
Qty: 30 TABLET | Refills: 0 | Status: SHIPPED | OUTPATIENT
Start: 2024-11-11

## 2024-11-11 RX ORDER — DEXTROAMPHETAMINE SACCHARATE, AMPHETAMINE ASPARTATE, DEXTROAMPHETAMINE SULFATE AND AMPHETAMINE SULFATE 5; 5; 5; 5 MG/1; MG/1; MG/1; MG/1
1 TABLET ORAL DAILY
Qty: 30 TABLET | Refills: 0 | Status: SHIPPED | OUTPATIENT
Start: 2024-11-11

## 2024-12-10 DIAGNOSIS — F98.8 ATTENTION DEFICIT DISORDER, UNSPECIFIED TYPE: ICD-10-CM

## 2024-12-10 NOTE — TELEPHONE ENCOUNTER
No care due was identified.  [facility] Embedded Care Due Messages. Reference number: [id number].   12/10/2024 8:13:58 AM CST

## 2024-12-11 RX ORDER — DEXTROAMPHETAMINE SACCHARATE, AMPHETAMINE ASPARTATE, DEXTROAMPHETAMINE SULFATE AND AMPHETAMINE SULFATE 5; 5; 5; 5 MG/1; MG/1; MG/1; MG/1
1 TABLET ORAL DAILY
Qty: 30 TABLET | Refills: 0 | Status: SHIPPED | OUTPATIENT
Start: 2024-12-11

## 2024-12-11 RX ORDER — DEXTROAMPHETAMINE SACCHARATE, AMPHETAMINE ASPARTATE, DEXTROAMPHETAMINE SULFATE AND AMPHETAMINE SULFATE 2.5; 2.5; 2.5; 2.5 MG/1; MG/1; MG/1; MG/1
TABLET ORAL
Qty: 30 TABLET | Refills: 0 | Status: SHIPPED | OUTPATIENT
Start: 2024-12-11

## 2025-01-09 DIAGNOSIS — F98.8 ATTENTION DEFICIT DISORDER, UNSPECIFIED TYPE: ICD-10-CM

## 2025-01-09 RX ORDER — DEXTROAMPHETAMINE SACCHARATE, AMPHETAMINE ASPARTATE, DEXTROAMPHETAMINE SULFATE AND AMPHETAMINE SULFATE 2.5; 2.5; 2.5; 2.5 MG/1; MG/1; MG/1; MG/1
TABLET ORAL
Qty: 30 TABLET | Refills: 0 | Status: SHIPPED | OUTPATIENT
Start: 2025-01-09

## 2025-01-09 RX ORDER — DEXTROAMPHETAMINE SACCHARATE, AMPHETAMINE ASPARTATE, DEXTROAMPHETAMINE SULFATE AND AMPHETAMINE SULFATE 5; 5; 5; 5 MG/1; MG/1; MG/1; MG/1
1 TABLET ORAL DAILY
Qty: 30 TABLET | Refills: 0 | Status: SHIPPED | OUTPATIENT
Start: 2025-01-09

## 2025-01-09 NOTE — TELEPHONE ENCOUNTER
No care due was identified.  [facility] Embedded Care Due Messages. Reference number: [id number].   1/09/2025 4:14:47 PM CST

## 2025-02-07 DIAGNOSIS — F98.8 ATTENTION DEFICIT DISORDER, UNSPECIFIED TYPE: ICD-10-CM

## 2025-02-07 NOTE — TELEPHONE ENCOUNTER
No care due was identified.  [facility] Embedded Care Due Messages. Reference number: [id number].   2/07/2025 1:06:55 PM CST

## 2025-02-10 RX ORDER — DEXTROAMPHETAMINE SACCHARATE, AMPHETAMINE ASPARTATE, DEXTROAMPHETAMINE SULFATE AND AMPHETAMINE SULFATE 2.5; 2.5; 2.5; 2.5 MG/1; MG/1; MG/1; MG/1
TABLET ORAL
Qty: 30 TABLET | Refills: 0 | Status: SHIPPED | OUTPATIENT
Start: 2025-02-10

## 2025-02-10 RX ORDER — DEXTROAMPHETAMINE SACCHARATE, AMPHETAMINE ASPARTATE, DEXTROAMPHETAMINE SULFATE AND AMPHETAMINE SULFATE 5; 5; 5; 5 MG/1; MG/1; MG/1; MG/1
1 TABLET ORAL DAILY
Qty: 30 TABLET | Refills: 0 | Status: SHIPPED | OUTPATIENT
Start: 2025-02-10

## 2025-03-10 ENCOUNTER — OFFICE VISIT (OUTPATIENT)
Dept: FAMILY MEDICINE | Facility: CLINIC | Age: 30
End: 2025-03-10
Attending: FAMILY MEDICINE
Payer: COMMERCIAL

## 2025-03-10 VITALS
OXYGEN SATURATION: 99 % | DIASTOLIC BLOOD PRESSURE: 78 MMHG | TEMPERATURE: 98 F | HEIGHT: 76 IN | BODY MASS INDEX: 25.73 KG/M2 | WEIGHT: 211.31 LBS | SYSTOLIC BLOOD PRESSURE: 118 MMHG | HEART RATE: 76 BPM

## 2025-03-10 DIAGNOSIS — F98.8 ATTENTION DEFICIT DISORDER, UNSPECIFIED TYPE: Primary | ICD-10-CM

## 2025-03-10 PROCEDURE — 3074F SYST BP LT 130 MM HG: CPT | Mod: CPTII,S$GLB,, | Performed by: FAMILY MEDICINE

## 2025-03-10 PROCEDURE — 1160F RVW MEDS BY RX/DR IN RCRD: CPT | Mod: CPTII,S$GLB,, | Performed by: FAMILY MEDICINE

## 2025-03-10 PROCEDURE — 3078F DIAST BP <80 MM HG: CPT | Mod: CPTII,S$GLB,, | Performed by: FAMILY MEDICINE

## 2025-03-10 PROCEDURE — 99999 PR PBB SHADOW E&M-EST. PATIENT-LVL III: CPT | Mod: PBBFAC,,, | Performed by: FAMILY MEDICINE

## 2025-03-10 PROCEDURE — 99214 OFFICE O/P EST MOD 30 MIN: CPT | Mod: S$GLB,,, | Performed by: FAMILY MEDICINE

## 2025-03-10 PROCEDURE — G2211 COMPLEX E/M VISIT ADD ON: HCPCS | Mod: S$GLB,,, | Performed by: FAMILY MEDICINE

## 2025-03-10 PROCEDURE — 1159F MED LIST DOCD IN RCRD: CPT | Mod: CPTII,S$GLB,, | Performed by: FAMILY MEDICINE

## 2025-03-10 PROCEDURE — 3008F BODY MASS INDEX DOCD: CPT | Mod: CPTII,S$GLB,, | Performed by: FAMILY MEDICINE

## 2025-03-10 RX ORDER — DEXTROAMPHETAMINE SACCHARATE, AMPHETAMINE ASPARTATE, DEXTROAMPHETAMINE SULFATE AND AMPHETAMINE SULFATE 5; 5; 5; 5 MG/1; MG/1; MG/1; MG/1
TABLET ORAL
Qty: 60 TABLET | Refills: 0 | Status: SHIPPED | OUTPATIENT
Start: 2025-05-09

## 2025-03-10 RX ORDER — DEXTROAMPHETAMINE SACCHARATE, AMPHETAMINE ASPARTATE, DEXTROAMPHETAMINE SULFATE AND AMPHETAMINE SULFATE 5; 5; 5; 5 MG/1; MG/1; MG/1; MG/1
TABLET ORAL
Qty: 60 TABLET | Refills: 0 | Status: SHIPPED | OUTPATIENT
Start: 2025-03-10 | End: 2025-03-10 | Stop reason: SDUPTHER

## 2025-03-10 RX ORDER — DEXTROAMPHETAMINE SACCHARATE, AMPHETAMINE ASPARTATE, DEXTROAMPHETAMINE SULFATE AND AMPHETAMINE SULFATE 5; 5; 5; 5 MG/1; MG/1; MG/1; MG/1
TABLET ORAL
Qty: 60 TABLET | Refills: 0 | Status: SHIPPED | OUTPATIENT
Start: 2025-04-09 | End: 2025-03-10 | Stop reason: SDUPTHER

## 2025-03-10 NOTE — PROGRESS NOTES
Subjective:       Patient ID: Stiven Arce is a 29 y.o. male.    Chief Complaint: Follow-up and Medication Refill    29 y old male with ADD and MINI here for f.u . On Adderall 30 mg IR divided. Focus wears off around 3 pm . No mood disorders, anorexia , worsening anxiety .       Review of Systems   Constitutional: Negative.    HENT: Negative.     Eyes: Negative.    Respiratory: Negative.     Cardiovascular: Negative.    Gastrointestinal: Negative.    Genitourinary: Negative.    Musculoskeletal: Negative.    Skin: Negative.    Hematological: Negative.    Psychiatric/Behavioral:  Positive for decreased concentration.        Objective:      Physical Exam  Constitutional:       General: He is not in acute distress.     Appearance: He is well-developed. He is not diaphoretic.   HENT:      Head: Normocephalic and atraumatic.      Right Ear: External ear normal.      Left Ear: External ear normal.      Nose: Nose normal.      Mouth/Throat:      Pharynx: No oropharyngeal exudate.   Eyes:      General: No scleral icterus.        Right eye: No discharge.         Left eye: No discharge.      Conjunctiva/sclera: Conjunctivae normal.      Pupils: Pupils are equal, round, and reactive to light.   Neck:      Thyroid: No thyromegaly.      Vascular: No JVD.      Trachea: No tracheal deviation.   Cardiovascular:      Rate and Rhythm: Normal rate and regular rhythm.      Heart sounds: Normal heart sounds. No murmur heard.     No friction rub. No gallop.   Pulmonary:      Effort: Pulmonary effort is normal. No respiratory distress.      Breath sounds: Normal breath sounds. No stridor. No wheezing or rales.   Chest:      Chest wall: No tenderness.   Abdominal:      General: Bowel sounds are normal. There is no distension.      Palpations: Abdomen is soft.      Tenderness: There is no abdominal tenderness. There is no guarding or rebound.   Musculoskeletal:         General: No tenderness. Normal range of motion.      Cervical  back: Normal range of motion and neck supple.   Lymphadenopathy:      Cervical: No cervical adenopathy.   Skin:     General: Skin is warm and dry.      Coloration: Skin is not pale.      Findings: No erythema or rash.   Neurological:      Mental Status: He is alert and oriented to person, place, and time.      Cranial Nerves: No cranial nerve deficit.      Motor: No abnormal muscle tone.      Coordination: Coordination normal.      Deep Tendon Reflexes: Reflexes are normal and symmetric. Reflexes normal.   Psychiatric:         Behavior: Behavior normal.         Thought Content: Thought content normal.         Judgment: Judgment normal.         Assessment:       1. Attention deficit disorder, unspecified type        Plan:     Stiven was seen today for follow-up and medication refill.    Diagnoses and all orders for this visit:    Attention deficit disorder, unspecified type  -     Discontinue: dextroamphetamine-amphetamine (ADDERALL) 20 mg tablet; 1 tab po BID  -     Discontinue: dextroamphetamine-amphetamine (ADDERALL) 20 mg tablet; 1 tab po BID  -     dextroamphetamine-amphetamine (ADDERALL) 20 mg tablet; 1 tab po BID     1.- Uncontrolled. Increase Adderall  IR to 20 mg po BID   F.u in 6 m

## 2025-04-07 DIAGNOSIS — F98.8 ATTENTION DEFICIT DISORDER, UNSPECIFIED TYPE: ICD-10-CM

## 2025-04-07 RX ORDER — DEXTROAMPHETAMINE SACCHARATE, AMPHETAMINE ASPARTATE, DEXTROAMPHETAMINE SULFATE AND AMPHETAMINE SULFATE 5; 5; 5; 5 MG/1; MG/1; MG/1; MG/1
TABLET ORAL
Qty: 60 TABLET | Refills: 0 | Status: SHIPPED | OUTPATIENT
Start: 2025-04-07

## 2025-04-07 NOTE — TELEPHONE ENCOUNTER
No care due was identified.  [facility] Embedded Care Due Messages. Reference number: [id number].   4/07/2025 10:25:23 AM CDT

## 2025-04-07 NOTE — TELEPHONE ENCOUNTER
----- Message from Marla sent at 4/7/2025 10:11 AM CDT -----  Contact: Stiven  .Type:  RX Refill RequestWho Called: PatientRefill or New Rx:RefillRX Name and Strength:dextroamphetamine-amphetamine (ADDERALL) 20 mg tabletHow is the patient currently taking it? (ex. 1XDay):Is this a 30 day or 90 day RX:30Preferred Pharmacy with phone number:.[id number] - [place], [place] - [id number] [facility] [address]Wxpar: [phone] Fax: [phone] or Mail Order:localOrdering Provider:Bib Jason the patient rather a call back or a response via [facility]? [facility] Call Back Number:.[phone] (home) Additional Information:

## 2025-05-08 DIAGNOSIS — F98.8 ATTENTION DEFICIT DISORDER, UNSPECIFIED TYPE: ICD-10-CM

## 2025-05-08 RX ORDER — DEXTROAMPHETAMINE SACCHARATE, AMPHETAMINE ASPARTATE, DEXTROAMPHETAMINE SULFATE AND AMPHETAMINE SULFATE 5; 5; 5; 5 MG/1; MG/1; MG/1; MG/1
TABLET ORAL
Qty: 60 TABLET | Refills: 0 | Status: SHIPPED | OUTPATIENT
Start: 2025-05-08

## 2025-05-08 NOTE — TELEPHONE ENCOUNTER
No care due was identified.  [facility] Embedded Care Due Messages. Reference number: [id number].   5/08/2025 8:19:18 AM CDT

## 2025-06-11 DIAGNOSIS — F98.8 ATTENTION DEFICIT DISORDER, UNSPECIFIED TYPE: ICD-10-CM

## 2025-06-11 RX ORDER — DEXTROAMPHETAMINE SACCHARATE, AMPHETAMINE ASPARTATE, DEXTROAMPHETAMINE SULFATE AND AMPHETAMINE SULFATE 5; 5; 5; 5 MG/1; MG/1; MG/1; MG/1
TABLET ORAL
Qty: 60 TABLET | Refills: 0 | Status: SHIPPED | OUTPATIENT
Start: 2025-06-11

## 2025-06-11 NOTE — TELEPHONE ENCOUNTER
No care due was identified.  [facility] Embedded Care Due Messages. Reference number: [id number].   6/11/2025 12:48:10 PM CDT

## 2025-07-09 DIAGNOSIS — F98.8 ATTENTION DEFICIT DISORDER, UNSPECIFIED TYPE: ICD-10-CM

## 2025-07-09 RX ORDER — DEXTROAMPHETAMINE SACCHARATE, AMPHETAMINE ASPARTATE, DEXTROAMPHETAMINE SULFATE AND AMPHETAMINE SULFATE 5; 5; 5; 5 MG/1; MG/1; MG/1; MG/1
TABLET ORAL
Qty: 60 TABLET | Refills: 0 | Status: SHIPPED | OUTPATIENT
Start: 2025-07-09

## 2025-08-12 DIAGNOSIS — F98.8 ATTENTION DEFICIT DISORDER, UNSPECIFIED TYPE: ICD-10-CM

## 2025-08-12 RX ORDER — DEXTROAMPHETAMINE SACCHARATE, AMPHETAMINE ASPARTATE, DEXTROAMPHETAMINE SULFATE AND AMPHETAMINE SULFATE 5; 5; 5; 5 MG/1; MG/1; MG/1; MG/1
TABLET ORAL
Qty: 60 TABLET | Refills: 0 | Status: SHIPPED | OUTPATIENT
Start: 2025-08-12